# Patient Record
Sex: FEMALE | Race: WHITE | Employment: UNEMPLOYED | URBAN - METROPOLITAN AREA
[De-identification: names, ages, dates, MRNs, and addresses within clinical notes are randomized per-mention and may not be internally consistent; named-entity substitution may affect disease eponyms.]

---

## 2017-12-26 LAB — HEPATITIS C ANTIBODY INTERPRETATION: NORMAL

## 2017-12-29 LAB
HPV COMMENT: NORMAL
HPV TYPE 16: NOT DETECTED
HPV TYPE 18: NOT DETECTED
HPVOH (OTHER TYPES): NOT DETECTED

## 2018-02-05 ENCOUNTER — HOSPITAL ENCOUNTER (OUTPATIENT)
Dept: ENDOSCOPY | Age: 53
Discharge: HOME | End: 2018-02-05
Attending: INTERNAL MEDICINE | Admitting: INTERNAL MEDICINE

## 2018-02-05 LAB — HCG(URINE) PREGNANCY TEST: NEGATIVE

## 2018-02-15 ENCOUNTER — OFFICE VISIT (OUTPATIENT)
Dept: FAMILY MEDICINE CLINIC | Age: 53
End: 2018-02-15

## 2018-02-15 VITALS
DIASTOLIC BLOOD PRESSURE: 68 MMHG | SYSTOLIC BLOOD PRESSURE: 106 MMHG | OXYGEN SATURATION: 98 % | RESPIRATION RATE: 16 BRPM | BODY MASS INDEX: 23.14 KG/M2 | TEMPERATURE: 98 F | HEIGHT: 66 IN | WEIGHT: 144 LBS | HEART RATE: 70 BPM

## 2018-02-15 DIAGNOSIS — Z76.89 ENCOUNTER TO ESTABLISH CARE WITH NEW DOCTOR: Primary | ICD-10-CM

## 2018-02-15 PROCEDURE — 99386 PREV VISIT NEW AGE 40-64: CPT | Performed by: FAMILY MEDICINE

## 2018-02-15 RX ORDER — BUTALBITAL, ACETAMINOPHEN AND CAFFEINE 50; 325; 40 MG/1; MG/1; MG/1
1 TABLET ORAL PRN
COMMUNITY
Start: 2018-02-13 | End: 2019-12-20

## 2018-02-15 RX ORDER — PHENOL 1.4 %
1 AEROSOL, SPRAY (ML) MUCOUS MEMBRANE DAILY
COMMUNITY

## 2018-02-15 ASSESSMENT — ENCOUNTER SYMPTOMS
ABDOMINAL PAIN: 0
CHEST TIGHTNESS: 0
NAUSEA: 0
EYE PAIN: 0
COUGH: 0
VOMITING: 0
SORE THROAT: 0
CONSTIPATION: 0
WHEEZING: 0
RHINORRHEA: 0
SHORTNESS OF BREATH: 0
DIARRHEA: 0
ANAL BLEEDING: 0
BACK PAIN: 0
TROUBLE SWALLOWING: 0
EYE REDNESS: 0

## 2018-02-15 NOTE — PROGRESS NOTES
[facility]  Clinic Note    Date: 2/15/2018                                               Subjective:     Chief Complaint   Patient presents with    New Patient     NEW PT TO ESTABLISH CARE     HPI  Recently tried to apply for health insurance but denied due to error of diagnosis of Hep C which she has never had. She brings in copies of her testing for hepatitis which are negative. Had colonoscopy earlier this month, normal and recheck in 10 years. Recent pap smear which is normal and negative for HPV. Mammogram in Dec 2017 which was normal.  Was prescribed anti depressant in  but did not take, chart reports history of depression, which she does not have. Does get some hot flashes related to menopause. Recent fasting labs at [facility] that were 100% normal including CBC, CMP, and lipid panel. HDL 92. Patient Active Problem List    Diagnosis Date Noted    Tension headache 2016    Family history of breast cancer      Past Medical History:   Diagnosis Date    Anxiety     Family history of breast cancer     mother in 46s    Routine gynecological examination     mitchel    Tension headache      Past Surgical History:   Procedure Laterality Date     SECTION      MAXILLECTOMY  2009    WISDOM TOOTH EXTRACTION      X [address] Outpatient Visit on 2018   Component Date Value Ref Range Status    HCG(Urine) Pregnancy Test 2018 Negative  Detects HCG level >20 MIU/mL Final    Comment: Note:  False Negative pregnancy results have been reported in  early pregnancy due to insufficient amounts of hCG and in late  first trimester pregnancies, though very rare, due to the hook  effect.   Always repeat results in question with a serum  quantitative pregnancy test. A serum hCG is positive 2-5 days  before the urine pregnancy test.       Family History   Problem Relation Age of Onset    Breast Cancer Mother       age 80 met ds,in , hx of lymphoma also age 36    High Blood Pressure Mother     Cancer Father      gastric , age 68   [address] [facility] No Known Problems Sister     No Known Problems Sister     No Known Problems Sister     Colon Cancer Maternal Grandmother      Current Outpatient Prescriptions   Medication Sig Dispense Refill    butalbital-acetaminophen-caffeine (FIORICET, ESGIC) -40 MG per tablet Take 1 tablet by mouth as needed      vitamin D (CHOLECALCIFEROL) 1000 units TABS tablet Take 2,000 Units by mouth daily      calcium carbonate 600 MG TABS tablet Take 1 tablet by mouth daily      butalbital-acetaminophen-caffeine (FIORICET, ESGIC) -40 MG per tablet Take 1 tablet by mouth every 6 hours as needed for Headaches 40 tablet 1     No current facility-administered medications for this visit. Allergies   Allergen Reactions    Latex      WELTS       Review of Systems   Constitutional: Negative for chills, fever and unexpected weight change. HENT: Negative for congestion, ear pain, mouth sores, rhinorrhea, sore throat and trouble swallowing. Eyes: Negative for pain, redness and visual disturbance. Respiratory: Negative for cough, chest tightness, shortness of breath and wheezing. Cardiovascular: Negative for chest pain, palpitations and leg swelling. Gastrointestinal: Negative for abdominal pain, anal bleeding, constipation, diarrhea, nausea and vomiting. Genitourinary: Negative for decreased urine volume, difficulty urinating, dysuria, frequency and hematuria. Musculoskeletal: Negative for arthralgias, back pain, joint swelling and neck pain. Neurological: Positive for headaches. Negative for dizziness, tremors, seizures, syncope and numbness. Psychiatric/Behavioral: Negative for dysphoric mood and sleep disturbance. The patient is not nervous/anxious.         Objective:  /68 (Site: Right Arm, Position: Sitting, Cuff Size: Medium Adult)   Pulse 70   Temp 98 °F (36.7 °C) (Oral)   Resp 16   Ht 5' 5.5\" (1.664 m)

## 2018-12-12 ENCOUNTER — OFFICE VISIT (OUTPATIENT)
Dept: ENT CLINIC | Age: 53
End: 2018-12-12
Payer: COMMERCIAL

## 2018-12-12 VITALS
DIASTOLIC BLOOD PRESSURE: 76 MMHG | WEIGHT: 150 LBS | BODY MASS INDEX: 24.11 KG/M2 | HEIGHT: 66 IN | SYSTOLIC BLOOD PRESSURE: 118 MMHG

## 2018-12-12 DIAGNOSIS — H81.01 LABYRINTHINE VERTIGO WITH INVOLVEMENT OF RIGHT INNER EAR: Primary | ICD-10-CM

## 2018-12-12 PROCEDURE — 99204 OFFICE O/P NEW MOD 45 MIN: CPT | Performed by: OTOLARYNGOLOGY

## 2018-12-12 PROCEDURE — 1036F TOBACCO NON-USER: CPT | Performed by: OTOLARYNGOLOGY

## 2018-12-12 PROCEDURE — G8420 CALC BMI NORM PARAMETERS: HCPCS | Performed by: OTOLARYNGOLOGY

## 2018-12-12 PROCEDURE — G8427 DOCREV CUR MEDS BY ELIG CLIN: HCPCS | Performed by: OTOLARYNGOLOGY

## 2018-12-12 PROCEDURE — 3017F COLORECTAL CA SCREEN DOC REV: CPT | Performed by: OTOLARYNGOLOGY

## 2018-12-12 PROCEDURE — G8484 FLU IMMUNIZE NO ADMIN: HCPCS | Performed by: OTOLARYNGOLOGY

## 2018-12-12 RX ORDER — PREDNISONE 10 MG/1
TABLET ORAL
Qty: 25 TABLET | Refills: 0 | Status: SHIPPED | OUTPATIENT
Start: 2018-12-12 | End: 2019-06-12 | Stop reason: ALTCHOICE

## 2018-12-12 ASSESSMENT — ENCOUNTER SYMPTOMS
VOICE CHANGE: 0
EYES NEGATIVE: 1
SORE THROAT: 0
SINUS PAIN: 0
FACIAL SWELLING: 0
TROUBLE SWALLOWING: 0
RESPIRATORY NEGATIVE: 1
SINUS PRESSURE: 0
ALLERGIC/IMMUNOLOGIC NEGATIVE: 1
RHINORRHEA: 0

## 2019-06-12 ENCOUNTER — OFFICE VISIT (OUTPATIENT)
Dept: FAMILY MEDICINE CLINIC | Age: 54
End: 2019-06-12
Payer: COMMERCIAL

## 2019-06-12 VITALS
SYSTOLIC BLOOD PRESSURE: 112 MMHG | HEIGHT: 66 IN | HEART RATE: 68 BPM | DIASTOLIC BLOOD PRESSURE: 62 MMHG | WEIGHT: 154.8 LBS | RESPIRATION RATE: 18 BRPM | BODY MASS INDEX: 24.88 KG/M2

## 2019-06-12 DIAGNOSIS — G43.819 OTHER MIGRAINE WITHOUT STATUS MIGRAINOSUS, INTRACTABLE: Primary | ICD-10-CM

## 2019-06-12 PROCEDURE — 3017F COLORECTAL CA SCREEN DOC REV: CPT | Performed by: NURSE PRACTITIONER

## 2019-06-12 PROCEDURE — G8427 DOCREV CUR MEDS BY ELIG CLIN: HCPCS | Performed by: NURSE PRACTITIONER

## 2019-06-12 PROCEDURE — 1036F TOBACCO NON-USER: CPT | Performed by: NURSE PRACTITIONER

## 2019-06-12 PROCEDURE — 99213 OFFICE O/P EST LOW 20 MIN: CPT | Performed by: NURSE PRACTITIONER

## 2019-06-12 PROCEDURE — G8419 CALC BMI OUT NRM PARAM NOF/U: HCPCS | Performed by: NURSE PRACTITIONER

## 2019-06-12 RX ORDER — KETOROLAC TROMETHAMINE 30 MG/ML
60 INJECTION, SOLUTION INTRAMUSCULAR; INTRAVENOUS ONCE
Status: COMPLETED | OUTPATIENT
Start: 2019-06-12 | End: 2019-06-12

## 2019-06-12 RX ORDER — BUTALBITAL, ACETAMINOPHEN AND CAFFEINE 50; 325; 40 MG/1; MG/1; MG/1
1 TABLET ORAL EVERY 4 HOURS PRN
Qty: 20 TABLET | Refills: 2 | Status: SHIPPED | OUTPATIENT
Start: 2019-06-12 | End: 2019-10-07 | Stop reason: SDUPTHER

## 2019-06-12 RX ADMIN — KETOROLAC TROMETHAMINE 60 MG: 30 INJECTION, SOLUTION INTRAMUSCULAR; INTRAVENOUS at 16:18

## 2019-06-12 ASSESSMENT — PATIENT HEALTH QUESTIONNAIRE - PHQ9
1. LITTLE INTEREST OR PLEASURE IN DOING THINGS: 0
SUM OF ALL RESPONSES TO PHQ9 QUESTIONS 1 & 2: 1
2. FEELING DOWN, DEPRESSED OR HOPELESS: 1
SUM OF ALL RESPONSES TO PHQ QUESTIONS 1-9: 1
SUM OF ALL RESPONSES TO PHQ QUESTIONS 1-9: 1

## 2019-06-12 NOTE — PROGRESS NOTES
Administrations This Visit     ketorolac (TORADOL) injection 60 mg     Admin Date  06/12/2019  16:18 Action  Given Dose  60 mg Route  Intramuscular Site  Ventrogluteal Right Administered By  Jolly Giraldo MA    Ordering Provider:  CHRISTIANA Reddy CNP    NDC:  3759-9669-40    Lot#:  [id number]    :  [employer]    Patient Supplied?:  No    Comments:  SITE: RIGHT [facility]# [id number] -XENTO DATE: 04/01/2020VERIFIED BY: LISA
oz (70.2 kg)   Height: 5' 5.5\" (1.664 m)     Body mass index is 25.37 kg/m². Wt Readings from Last 3 Encounters:   06/12/19 154 lb 12.8 oz (70.2 kg)   12/12/18 150 lb (68 kg)   02/15/18 144 lb (65.3 kg)     BP Readings from Last 3 Encounters:   06/12/19 112/62   12/12/18 118/76   02/15/18 106/68        No results found for this visit on 06/12/19. Assessment     Diagnosis Orders   1. Other migraine without status migrainosus, intractable  butalbital-acetaminophen-caffeine (FIORICET, ESGIC) -40 MG per tablet         Plan    Migraine headache x3 days  I discussed with patient that in order to get this headache controlled it would be best that she get an injectable to start and then to follow with the Fioricet. Toradol 60 mg IM given  Fioricet given for as needed for the migraines in the future  It is best to hit the migraine at onset with the medication, as they are harder to control once they have been going on this long. She appears to be harboring a lot of emotions that are contributing to these migraines, currently    No follow-ups on file. There are no Patient Instructions on file for this visit.

## 2019-10-07 ENCOUNTER — TELEPHONE (OUTPATIENT)
Dept: FAMILY MEDICINE CLINIC | Age: 54
End: 2019-10-07

## 2019-10-07 DIAGNOSIS — G43.819 OTHER MIGRAINE WITHOUT STATUS MIGRAINOSUS, INTRACTABLE: ICD-10-CM

## 2019-10-07 RX ORDER — BUTALBITAL, ACETAMINOPHEN AND CAFFEINE 50; 325; 40 MG/1; MG/1; MG/1
1 TABLET ORAL EVERY 4 HOURS PRN
Qty: 20 TABLET | Refills: 0 | Status: SHIPPED | OUTPATIENT
Start: 2019-10-07 | End: 2019-12-20 | Stop reason: SDUPTHER

## 2019-12-20 ENCOUNTER — TELEPHONE (OUTPATIENT)
Dept: FAMILY MEDICINE CLINIC | Age: 54
End: 2019-12-20

## 2019-12-20 DIAGNOSIS — G43.819 OTHER MIGRAINE WITHOUT STATUS MIGRAINOSUS, INTRACTABLE: ICD-10-CM

## 2019-12-20 RX ORDER — BUTALBITAL, ACETAMINOPHEN AND CAFFEINE 50; 325; 40 MG/1; MG/1; MG/1
1 TABLET ORAL EVERY 4 HOURS PRN
Qty: 20 TABLET | Refills: 0 | Status: SHIPPED | OUTPATIENT
Start: 2019-12-20 | End: 2020-01-09 | Stop reason: SDUPTHER

## 2020-01-09 ENCOUNTER — OFFICE VISIT (OUTPATIENT)
Dept: FAMILY MEDICINE CLINIC | Age: 55
End: 2020-01-09
Payer: COMMERCIAL

## 2020-01-09 VITALS
BODY MASS INDEX: 25.88 KG/M2 | SYSTOLIC BLOOD PRESSURE: 100 MMHG | HEART RATE: 74 BPM | HEIGHT: 66 IN | RESPIRATION RATE: 20 BRPM | OXYGEN SATURATION: 98 % | WEIGHT: 161 LBS | DIASTOLIC BLOOD PRESSURE: 62 MMHG

## 2020-01-09 PROCEDURE — 99214 OFFICE O/P EST MOD 30 MIN: CPT | Performed by: NURSE PRACTITIONER

## 2020-01-09 RX ORDER — TOPIRAMATE 50 MG/1
TABLET, FILM COATED ORAL
Qty: 60 TABLET | Refills: 3 | Status: SHIPPED | OUTPATIENT
Start: 2020-01-09

## 2020-01-09 RX ORDER — BUTALBITAL, ACETAMINOPHEN AND CAFFEINE 50; 325; 40 MG/1; MG/1; MG/1
1 TABLET ORAL EVERY 4 HOURS PRN
Qty: 20 TABLET | Refills: 3 | Status: SHIPPED | OUTPATIENT
Start: 2020-01-09

## 2020-01-09 ASSESSMENT — PATIENT HEALTH QUESTIONNAIRE - PHQ9
1. LITTLE INTEREST OR PLEASURE IN DOING THINGS: 0
SUM OF ALL RESPONSES TO PHQ9 QUESTIONS 1 & 2: 0
2. FEELING DOWN, DEPRESSED OR HOPELESS: 0
SUM OF ALL RESPONSES TO PHQ QUESTIONS 1-9: 0
SUM OF ALL RESPONSES TO PHQ QUESTIONS 1-9: 0

## 2020-01-09 NOTE — PROGRESS NOTES
Mahesh Caba  49 y.o. female    1965      CC: Migraine follow up     Chief Complaint   Patient presents with    Migraine     PT IS HERE FOR MIGRAINE MEDICATION REFILLS      HPI    Migraine follow up - doing better right now - not sure why. Were worse through holidays. Was getting 1-3 weekly - some migraines would last up to 3 days. Since Valentin - no migraines. Has had headaches, but not migraines. Weather affects her migraines. Barometric pressure affects the migraines. Has trigger foods, but not sure what they are. Migraine symptoms - nausea, + light sensitivity, sometimes sound sensitive, never consistent location. Sometimes left, sometimes right, or temporal, but definite condensed area, usually behind eyes - one eye really hurts. Has never been on a prevention medication. Does tend to get more than 4 monthly. Allergies   Allergen Reactions    Latex      WELTS    Sulfa Antibiotics Rash       Physical  Examination    Physical Exam  Vitals signs and nursing note reviewed. Constitutional:       General: She is not in acute distress. Appearance: She is not diaphoretic. HENT:      Head: Normocephalic. Comments: When headaches occur - behind eyes. Cardiovascular:      Rate and Rhythm: Normal rate and regular rhythm. Heart sounds: No murmur. No friction rub. No gallop. Pulmonary:      Effort: Pulmonary effort is normal. No accessory muscle usage or respiratory distress. Breath sounds: No decreased breath sounds, wheezing, rhonchi or rales. Skin:     General: Skin is warm and dry. Neurological:      Mental Status: She is alert and oriented to person, place, and time. Psychiatric:         Mood and Affect: Mood normal.         Behavior: Behavior normal.         Thought Content: Thought content normal.         Judgment: Judgment normal.      Comments: Long discussion about how toxic her family is.           Vitals:    01/09/20 0810   BP:

## 2020-04-30 ENCOUNTER — TELEMEDICINE (OUTPATIENT)
Dept: FAMILY MEDICINE CLINIC | Age: 55
End: 2020-04-30
Payer: COMMERCIAL

## 2020-04-30 VITALS — HEIGHT: 66 IN | BODY MASS INDEX: 26.38 KG/M2

## 2020-04-30 PROCEDURE — 99214 OFFICE O/P EST MOD 30 MIN: CPT | Performed by: FAMILY MEDICINE

## 2020-04-30 RX ORDER — TRAMADOL HYDROCHLORIDE 50 MG/1
50 TABLET ORAL EVERY 4 HOURS PRN
Qty: 15 TABLET | Refills: 0 | Status: SHIPPED | OUTPATIENT
Start: 2020-04-30 | End: 2020-05-05

## 2020-04-30 RX ORDER — SUMATRIPTAN 50 MG/1
50 TABLET, FILM COATED ORAL DAILY PRN
Qty: 9 TABLET | Refills: 0 | Status: SHIPPED | OUTPATIENT
Start: 2020-04-30

## 2020-04-30 NOTE — PROGRESS NOTES
Alert and awake  [] Oriented to person/place/time [x]Able to follow commands      Eyes:  EOM    []  Normal  [] Abnormal-  Sclera  [x]  Normal  [] Abnormal -         Discharge [x]  None visible  [] Abnormal -    HENT:   [x] Normocephalic, atraumatic. [] Abnormal   [] Mouth/Throat: Mucous membranes are moist.     External Ears [] Normal  [] Abnormal-     Neck: [x] No visualized mass pain to palpation posterior right neck. No pain to palpation over lymph nodes. Pulmonary/Chest: [x] Respiratory effort normal.  [x] No visualized signs of difficulty breathing or respiratory distress        [] Abnormal-      Musculoskeletal:   [] Normal gait with no signs of ataxia         [] Normal range of motion of neck        [] Abnormal-  Pain to palp right post neck. Neurological:        [x] No Facial Asymmetry (Cranial nerve 7 motor function) (limited exam to video visit)          [x] No gaze palsy        [x] Abnormal-points to site of headache as behind her right eye. Skin:        [x] No significant exanthematous lesions or discoloration noted on facial skin         [] Abnormal-            Psychiatric:       [] Normal Affect [] No Hallucinations        [x] Abnormal-crying at the beginning of the session. Patient became more relaxed during the session and stopped crying. Other pertinent observable physical exam findings-   Vitals:    04/30/20 1548   Height: 5' 5.5\" (1.664 m)     BP Readings from Last 3 Encounters:   01/09/20 100/62   06/12/19 112/62   12/12/18 118/76     Pulse Readings from Last 3 Encounters:   01/09/20 74   06/12/19 68   02/15/18 70     Wt Readings from Last 3 Encounters:   01/09/20 161 lb (73 kg)   06/12/19 154 lb 12.8 oz (70.2 kg)   12/12/18 150 lb (68 kg)     Body mass index is 26.38 kg/m². ASSESSMENT/PLAN:  Seth Arrieta was seen today for migraine.     Diagnoses and all orders for this visit:    Intractable chronic migraine without aura and with status migrainosus  -     MAGNESIUM; Future  -     BASIC METABOLIC PANEL; Future  -     traMADol (ULTRAM) 50 MG tablet; Take 1 tablet by mouth every 4 hours as needed for Pain for up to 5 days. Intended supply: 5 days. Take lowest dose possible to manage pain  -     SUMAtriptan (IMITREX) 50 MG tablet; Take 1 tablet by mouth daily as needed for Migraine may repeat in 1 hr if no relief of acute migraine    Stay well-hydrated. Your goal for fluids is 73 ounces per day. Eat frequent small meals with some carbohydrates and some protein with each meal.  Avoid replacing a meal with sweets which stimulates insulin but without carbohydrates to back it up causes blood sugars to go down resulting in headaches. The magnesium is a good idea for migraine prevention but does not work as well for treatment. Recommend you take magnesium 250 mg once daily with a meal.  The only major side effect with magnesium is looser stools. If you have a problem with diarrhea you should not start this regimen. If you have constipation it will help. Another thing that can be helpful is a B complex vitamin once daily. Higher doses of riboflavin are often used for migraine prevention. Would recommend you schedule a visit just to discuss a supplement regimen that can help prevent migraines. If we tried to discuss this in the setting of an illness like a sinus infection we will not have adequate time to discuss which supplements are best for you and which you should avoid. Medication side effect  The Topamax causes numbness and tingling in many people. It is oftentimes associated with mild dehydration. It causes the carbon dioxide in your blood to go down which is what causes the tingling and may affect your taste as well. This is why when you are on this medication we always monitor your blood for kidney function to see if the CO2 (carbon dioxide) is low.   Is important with migraines regardless of what medicine you are on to drink plenty of fluids to stay hydrated. Educated about COVID-19 virus infection    Preventing the Spread of Coronavirus Disease 2019 in Homes and Residential Communities   For the most recent information go to [email or web address]    Prevention steps for People with confirmed or suspected COVID-19 (including persons under investigation) who do not need to be hospitalized  and   People with confirmed COVID-19 who were hospitalized and determined to be medically stable to go home    Your healthcare provider and public health staff will evaluate whether you can be cared for at home. If it is determined that you do not need to be hospitalized and can be isolated at home, you will be monitored by staff from your local or state health department. You should follow the prevention steps below until a healthcare provider or local or state health department says you can return to your normal activities. Stay home except to get medical care  People who are mildly ill with COVID-19 are able to isolate at home during their illness. You should restrict activities outside your home, except for getting medical care. Do not go to work, school, or public areas. Avoid using public transportation, ride-sharing, or taxis. Separate yourself from other people and animals in your home  People: As much as possible, you should stay in a specific room and away from other people in your home. Also, you should use a separate bathroom, if available. Animals: You should restrict contact with pets and other animals while you are sick with COVID-19, just like you would around other people. Although there have not been reports of pets or other animals becoming sick with COVID-19, it is still recommended that people sick with COVID-19 limit contact with animals until more information is known about the virus. When possible, have another member of your household care for your animals while you are sick.  If you are sick with COVID-19, avoid contact with your pet, including petting, snuggling, being kissed or licked, and sharing food. If you must care for your pet or be around animals while you are sick, wash your hands before and after you interact with pets and wear a facemask. Call ahead before visiting your doctor  If you have a medical appointment, call the healthcare provider and tell them that you have or may have COVID-19. This will help the healthcare providers office take steps to keep other people from getting infected or exposed. Wear a facemask  You should wear a facemask when you are around other people (e.g., sharing a room or vehicle) or pets and before you enter a healthcare providers office. If you are not able to wear a facemask (for example, because it causes trouble breathing), then people who live with you should not stay in the same room with you, or they should wear a facemask if they enter your room. Cover your coughs and sneezes  Cover your mouth and nose with a tissue when you cough or sneeze. Throw used tissues in a lined trash can. Immediately wash your hands with soap and water for at least 20 seconds or, if soap and water are not available, clean your hands with an alcohol-based hand  that contains at least 60% alcohol. Clean your hands often  Wash your hands often with soap and water for at least 20 seconds, especially after blowing your nose, coughing, or sneezing; going to the bathroom; and before eating or preparing food. If soap and water are not readily available, use an alcohol-based hand  with at least 60% alcohol, covering all surfaces of your hands and rubbing them together until they feel dry. Soap and water are the best option if hands are visibly dirty. Avoid touching your eyes, nose, and mouth with unwashed hands.   Avoid sharing personal household items  You should not share dishes, drinking glasses, cups, eating utensils, towels, or bedding with other people or even during treatment with antibiotics, until symptom free. 1. Water: Drink 1 ounce of water for every 2 pounds of body weight for adults, you need 74 ounces of water/fluids per day. This will loosen mucus in the head and chest & improve the weak feeling of dehydration, allow the body to get germ fighting resources to the infection. Half of fluids can be juice or sugar free Crystal Light. Don't count drinks with caffeine, alcohol or carbonation. Infants can have Pedialyte liquid or freezer pops. Avoid salt and sports drinks if you have high Blood Pressure, swelling in the feet or ankles or have heart problems. 2. Humidity: Humidify the air to 35-50% ( or until the windows fog over slightly). Can use a humidifier, vaporizer, boil water on the stove or put a coffee can full of water on the heater vents. This will loosen mucus from infections and allergies. 3. Sleep: Get 8-10 hours a night and rest during the evening after work or school. If you have trouble sleeping, adults can take Melatonin 5mg up to 2 tabs at bedtime ( not for children or pregnant women). If Mono is suspected then sleep during 9PM to 9AM time span (if possible.)  4. Cough: Take cough medicines with Guaifenesin ( to loosen chest or head congestion) and Dextromethorphan ( to decrease excess cough). Robitussin D.M. Syrup every 4-6 hrs OR Mucinex D. M. pills OR Delsym DM syrup twice a day. Use the pediatric formulations for children over 6 months making sure they are alcohol & sugar free for children, pregnant women, and diabetics. 5. Pain And Fevers: Take Acetaminophen ( Tylenol) for fevers, aches, and headaches. 2-500 mg every 8 hours for adults. Appropriate doses at bedtime for children may help them sleep better. If pregnant take 1 -500 mg (Tylenol) every 8 hours as needed.  Ibuprofen/Aleve/aspirin for pain and fevers SHOULD NOT BE USED IN THE SETTING OF POSSIBLE COVID-19 viral infection NOR if pregnant, if you have acid reflux, high blood

## 2020-04-30 NOTE — PATIENT INSTRUCTIONS
Instructions for Respiratory Infections (SAVE THIS SHEET)    For the first 7-14 days of symptoms follow instructions below, even before being seen in the office or even during treatment with antibiotics, until symptom free. 1. Water: Drink 1 ounce of water for every 2 pounds of body weight for adults, you need 74 ounces of water/fluids per day. This will loosen mucus in the head and chest & improve the weak feeling of dehydration, allow the body to get germ fighting resources to the infection. Half of fluids can be juice or sugar free Crystal Light. Don't count drinks with caffeine, alcohol or carbonation. Infants can have Pedialyte liquid or freezer pops. Avoid salt and sports drinks if you have high Blood Pressure, swelling in the feet or ankles or have heart problems. 2. Humidity: Humidify the air to 35-50% ( or until the windows fog over slightly). Can use a humidifier, vaporizer, boil water on the stove or put a coffee can full of water on the heater vents. This will loosen mucus from infections and allergies. 3. Sleep: Get 8-10 hours a night and rest during the evening after work or school. If you have trouble sleeping, adults can take Melatonin 5mg up to 2 tabs at bedtime ( not for children or pregnant women). If Mono is suspected then sleep during 9PM to 9AM time span (if possible.)  4. Cough: Take cough medicines with Guaifenesin ( to loosen chest or head congestion) and Dextromethorphan ( to decrease excess cough). Robitussin D.M. Syrup every 4-6 hrs OR Mucinex D. M. pills OR Delsym DM syrup twice a day. Use the pediatric formulations for children over 6 months making sure they are alcohol & sugar free for children, pregnant women, and diabetics. 5. Pain And Fevers: Take Acetaminophen ( Tylenol) for fevers, aches, and headaches. 2-500 mg every 8 hours for adults. Appropriate doses at bedtime for children may help them sleep better. If pregnant take 1 -500 mg (Tylenol) every 8 hours as needed.

## 2021-01-15 ENCOUNTER — OFFICE VISIT (OUTPATIENT)
Dept: FAMILY MEDICINE CLINIC | Age: 56
End: 2021-01-15
Payer: COMMERCIAL

## 2021-01-15 VITALS
SYSTOLIC BLOOD PRESSURE: 110 MMHG | HEART RATE: 68 BPM | WEIGHT: 163 LBS | BODY MASS INDEX: 26.2 KG/M2 | HEIGHT: 66 IN | TEMPERATURE: 97.6 F | DIASTOLIC BLOOD PRESSURE: 64 MMHG | OXYGEN SATURATION: 98 %

## 2021-01-15 DIAGNOSIS — Z12.31 ENCOUNTER FOR SCREENING MAMMOGRAM FOR MALIGNANT NEOPLASM OF BREAST: ICD-10-CM

## 2021-01-15 DIAGNOSIS — E78.89 ELEVATED HDL: ICD-10-CM

## 2021-01-15 DIAGNOSIS — R63.5 WEIGHT GAIN: ICD-10-CM

## 2021-01-15 DIAGNOSIS — I88.9 AXILLARY LYMPHADENITIS: ICD-10-CM

## 2021-01-15 DIAGNOSIS — R53.83 FATIGUE, UNSPECIFIED TYPE: ICD-10-CM

## 2021-01-15 DIAGNOSIS — Z80.3 FAMILY HISTORY OF BREAST CANCER: ICD-10-CM

## 2021-01-15 DIAGNOSIS — R59.0 LYMPHADENOPATHY, AXILLARY: Primary | ICD-10-CM

## 2021-01-15 DIAGNOSIS — R73.01 IMPAIRED FASTING GLUCOSE: ICD-10-CM

## 2021-01-15 LAB
A/G RATIO: 2 (ref 1.1–2.2)
ALBUMIN SERPL-MCNC: 4.5 G/DL (ref 3.4–5)
ALP BLD-CCNC: 62 U/L (ref 40–129)
ALT SERPL-CCNC: 26 U/L (ref 10–40)
ANION GAP SERPL CALCULATED.3IONS-SCNC: 9 MMOL/L (ref 3–16)
AST SERPL-CCNC: 22 U/L (ref 15–37)
BASOPHILS ABSOLUTE: 0 K/UL (ref 0–0.2)
BASOPHILS RELATIVE PERCENT: 0.8 %
BILIRUB SERPL-MCNC: 1 MG/DL (ref 0–1)
BUN BLDV-MCNC: 7 MG/DL (ref 7–20)
CALCIUM SERPL-MCNC: 9.4 MG/DL (ref 8.3–10.6)
CHLORIDE BLD-SCNC: 105 MMOL/L (ref 99–110)
CHOLESTEROL, TOTAL: 138 MG/DL (ref 0–199)
CO2: 26 MMOL/L (ref 21–32)
CREAT SERPL-MCNC: 0.6 MG/DL (ref 0.6–1.1)
EOSINOPHILS ABSOLUTE: 0.1 K/UL (ref 0–0.6)
EOSINOPHILS RELATIVE PERCENT: 1.9 %
GFR AFRICAN AMERICAN: >60
GFR NON-AFRICAN AMERICAN: >60
GLOBULIN: 2.2 G/DL
GLUCOSE BLD-MCNC: 90 MG/DL (ref 70–99)
HCT VFR BLD CALC: 39.1 % (ref 36–48)
HDLC SERPL-MCNC: 86 MG/DL (ref 40–60)
HEMOGLOBIN: 13.3 G/DL (ref 12–16)
LDL CHOLESTEROL CALCULATED: 43 MG/DL
LYMPHOCYTES ABSOLUTE: 1.2 K/UL (ref 1–5.1)
LYMPHOCYTES RELATIVE PERCENT: 39 %
MCH RBC QN AUTO: 32.2 PG (ref 26–34)
MCHC RBC AUTO-ENTMCNC: 34 G/DL (ref 31–36)
MCV RBC AUTO: 94.7 FL (ref 80–100)
MONOCYTES ABSOLUTE: 0.2 K/UL (ref 0–1.3)
MONOCYTES RELATIVE PERCENT: 7.9 %
NEUTROPHILS ABSOLUTE: 1.5 K/UL (ref 1.7–7.7)
NEUTROPHILS RELATIVE PERCENT: 50.4 %
PDW BLD-RTO: 13.1 % (ref 12.4–15.4)
PLATELET # BLD: 199 K/UL (ref 135–450)
PMV BLD AUTO: 8.3 FL (ref 5–10.5)
POTASSIUM SERPL-SCNC: 4.2 MMOL/L (ref 3.5–5.1)
RBC # BLD: 4.13 M/UL (ref 4–5.2)
SODIUM BLD-SCNC: 140 MMOL/L (ref 136–145)
TOTAL PROTEIN: 6.7 G/DL (ref 6.4–8.2)
TRIGL SERPL-MCNC: 43 MG/DL (ref 0–150)
VLDLC SERPL CALC-MCNC: 9 MG/DL
WBC # BLD: 3 K/UL (ref 4–11)

## 2021-01-15 PROCEDURE — 36415 COLL VENOUS BLD VENIPUNCTURE: CPT | Performed by: NURSE PRACTITIONER

## 2021-01-15 PROCEDURE — 99214 OFFICE O/P EST MOD 30 MIN: CPT | Performed by: NURSE PRACTITIONER

## 2021-01-15 ASSESSMENT — PATIENT HEALTH QUESTIONNAIRE - PHQ9
2. FEELING DOWN, DEPRESSED OR HOPELESS: 0
SUM OF ALL RESPONSES TO PHQ9 QUESTIONS 1 & 2: 0
SUM OF ALL RESPONSES TO PHQ QUESTIONS 1-9: 0

## 2021-01-15 ASSESSMENT — ENCOUNTER SYMPTOMS
EYE DISCHARGE: 0
NAUSEA: 0
ABDOMINAL PAIN: 0
COLOR CHANGE: 0
BACK PAIN: 0
ABDOMINAL DISTENTION: 0
CONSTIPATION: 0
COUGH: 0
CHEST TIGHTNESS: 0
SHORTNESS OF BREATH: 0
SINUS PAIN: 0
DIARRHEA: 0
SINUS PRESSURE: 0

## 2021-01-15 NOTE — PROGRESS NOTES
Date of Service:  1/15/2021    Khushbu Jamison (:  1965) is a 48 y.o. female, here for evaluation of the following medical concerns:    Chief Complaint   Patient presents with    Lymphadenopathy        HPI     Lymphadenopathy  Pt notes swollen lymph node in right axilla, painful. Pain radiates down arm, hurts worse at night. First noticed about 1 week ago. Hx of large infected lymph node in 6th grade and needed a surgery to have lymph node removed. Pt also reports left great toe randomly swelling and painful but also has toenail fungus. Both ankles swollen. Mother had kidney cancer and lymphoma. Not exercising like she used to. Fam hx breast cancer- sister had breast cancer, non genetic breast cancer per pt. Sister also having \"issues with lymph nodes. \" Last mammogram was in 2017. Pt is post menopausal.       Review of Systems   Constitutional: Negative for activity change, appetite change, fatigue, fever and unexpected weight change. HENT: Negative for congestion, ear pain, sinus pressure and sinus pain. Eyes: Negative for discharge and visual disturbance. Respiratory: Negative for cough, chest tightness and shortness of breath. Cardiovascular: Positive for leg swelling (dev ankles). Negative for chest pain and palpitations. Gastrointestinal: Negative for abdominal distention, abdominal pain, constipation, diarrhea and nausea. Endocrine: Negative for cold intolerance, heat intolerance, polydipsia, polyphagia and polyuria. Genitourinary: Negative for decreased urine volume, difficulty urinating, dysuria, flank pain, frequency and urgency. Musculoskeletal: Negative for arthralgias, back pain, gait problem, joint swelling, myalgias and neck pain. Skin: Negative for color change, rash and wound. Allergic/Immunologic: Negative for food allergies and immunocompromised state. General: She is awake. Appearance: Normal appearance. She is well-developed, well-groomed and overweight. She is not ill-appearing. HENT:      Head: Normocephalic and atraumatic. Right Ear: Hearing, tympanic membrane, ear canal and external ear normal.      Left Ear: Hearing, tympanic membrane, ear canal and external ear normal.      Nose: Nose normal.      Mouth/Throat:      Lips: Pink. Mouth: Mucous membranes are moist.      Pharynx: Oropharynx is clear. Eyes:      General: Lids are normal.      Extraocular Movements: Extraocular movements intact. Conjunctiva/sclera: Conjunctivae normal.      Pupils: Pupils are equal, round, and reactive to light. Neck:      Musculoskeletal: Full passive range of motion without pain, normal range of motion and neck supple. Thyroid: No thyromegaly. Vascular: No carotid bruit. Cardiovascular:      Rate and Rhythm: Normal rate. Pulses:           Carotid pulses are 2+ on the right side and 2+ on the left side. Radial pulses are 2+ on the right side and 2+ on the left side. Posterior tibial pulses are 2+ on the right side and 2+ on the left side. Heart sounds: Normal heart sounds, S1 normal and S2 normal. No murmur. Pulmonary:      Effort: Pulmonary effort is normal.      Breath sounds: Normal breath sounds. Chest:      Chest wall: No mass, lacerations, deformity, swelling, tenderness, crepitus or edema. There is no dullness to percussion. Breasts: Breasts are symmetrical.         Right: No swelling, bleeding, inverted nipple, mass, nipple discharge, skin change or tenderness. Left: No swelling, bleeding, inverted nipple, mass, nipple discharge, skin change or tenderness. Abdominal:      General: Bowel sounds are normal. There is no abdominal bruit. Palpations: Abdomen is soft. Tenderness: There is no abdominal tenderness.    Genitourinary:     Comments: Deferred Musculoskeletal: Normal range of motion. Right lower leg: No edema. Left lower leg: No edema. Lymphadenopathy:      Head:      Right side of head: No submental, submandibular, tonsillar, preauricular, posterior auricular or occipital adenopathy. Left side of head: No submental, submandibular, tonsillar, preauricular, posterior auricular or occipital adenopathy. Cervical: No cervical adenopathy. Right cervical: No superficial, deep or posterior cervical adenopathy. Left cervical: No superficial, deep or posterior cervical adenopathy. Upper Body:      Right upper body: Axillary adenopathy present. No supraclavicular, pectoral or epitrochlear adenopathy. Left upper body: No supraclavicular, axillary, pectoral or epitrochlear adenopathy. Skin:     General: Skin is warm and dry. Capillary Refill: Capillary refill takes less than 2 seconds. Neurological:      General: No focal deficit present. Mental Status: She is alert and oriented to person, place, and time. Mental status is at baseline. Sensory: Sensation is intact. Motor: Motor function is intact. Coordination: Coordination is intact. Gait: Gait is intact. Psychiatric:         Attention and Perception: Attention and perception normal.         Mood and Affect: Mood normal. Affect is tearful. Speech: Speech normal.         Behavior: Behavior normal. Behavior is cooperative. Thought Content: Thought content normal.         Cognition and Memory: Cognition and memory normal.         Judgment: Judgment normal.         ASSESSMENT/PLAN:  1. Lymphadenopathy, axillary  R axilla, small pea to marble sized tender lymph node  Says swelling occurs occasionally in right arm especially at night  Discussed concerns with pt  Breast exam performed with pt permission, no concerning areas in breasts    2. Axillary lymphadenitis  - US SOFT TISSUE LIMITED AREA;  Future - CALE DIGITAL SCREEN W OR WO CAD BILATERAL; Future  - CBC Auto Differential  - Comprehensive Metabolic Panel  Consider round of antibiotics after testing complete but no redness/warmth noted  Consider referral to hematology/oncology    3. Family history of breast cancer  Sister had breast cancer  - US SOFT TISSUE LIMITED AREA; Future  - CALE DIGITAL SCREEN W OR WO CAD BILATERAL; Future    4. Encounter for screening mammogram for malignant neoplasm of breast  Information printed and reviewed  Pt aware of possible concerns for breast cancer with any axilla lymphadenopathy  - CALE DIGITAL SCREEN W OR WO CAD BILATERAL; Future    5. Impaired fasting glucose  Health maintenance recommends diabetes screening but no imp fasting glucoses noted to link A1C to at this time  Pt states she has had hypoglycemia in past    6. Elevated HDL  - Lipid Panel  Work on limiting saturated fats in diet, and eating a healthy balance of fruits, vegetables, lean proteins, and multigrains. 7. Weight gain  Up 9 lbs in 18 months  - TSH with Reflex    8.  Fatigue, unspecified type  - TSH with Reflex  - CBC Auto Differential        Care Gaps Addressed  Call insurance company to discuss coverage for shingles vaccine (Shingrix) 2 dose series   TDAP vaccine recommended  Mammogram recommended  Flu vaccine declined I have reviewed patient's pertinent medical history, relevant laboratory and imaging studies, and past/future health maintenance. Discussed with the patient the importance of adhering to their current medication regimen as directed. Advised the patient that they should continue to work on eating a healthy balanced diet and staying active by exercising within their personal limits. Orders as listed above. Patient was advised to keep future appointments with their respective specialty care team(s). Patient had the opportunity to ask questions, all of which were answered to the best of my ability and with patient satisfaction. Patient understands and is agreeable with the care plan following today's visit. Patient is to schedule an appointment for any new or worsening symptoms. Go to ER for significant shortness of breath, chest pain, or uncontrolled pain or fever. I discussed with patient the risk and benefits of any medications that were prescribed today. I verified that the patient understands their medications, labs, and/or procedures. The patient is doing well with current medication regimen and does not have any barriers to adherence. The patient's self-management abilities are good. Return in about 3 months (around 4/15/2021) for Physical Exam.    An electronic signature was used to authenticate this note.     --CHRISTIANA Joshua - CNP on 1/15/2021 at 4:26 PM

## 2021-01-15 NOTE — PATIENT INSTRUCTIONS
Call insurance company to discuss coverage for shingles vaccine (Shingrix) 2 dose series, also ask about TDAP vaccine. Patient Education        Learning About Breast Cancer Screening  What is breast cancer screening? Breast cancer occurs when cells that are not normal grow in one or both of your breasts. Screening tests can help find breast cancer early. Cancer is easier to treat when it's found early. Having concerns about breast cancer is common. That's why it's important to talk with your doctor about when to start and how often to get screened for breast cancer. How is breast cancer screening done? Several screening tests can be used to check for breast cancer. Mammograms. These tests check for signs of cancer using X-rays. They can show tumors that are too small for you or your doctor to feel. During a mammogram, a machine squeezes your breasts to make them flatter and easier to X-ray. At least two pictures are taken of each breast. One is taken from the top and one from the side. 3-D mammograms. These tests are also called digital breast tomosynthesis. Your breast is positioned on a flat plate. A top plate is pressed against your breast to keep it in position. The X-ray arm then moves in an arc above the breast and takes many pictures. A computer uses these X-rays to create a three-dimensional image. Clinical breast exam.   In this exam, your doctor carefully feels your breasts and under your arms to check for lumps or other changes. Who should be screened for breast cancer? Experts agree that mammograms are the best screening test for people at average risk of breast cancer. But they don't all agree on the age at which screening should start. And they don't agree on whether it's better to be screened every year or every two years. Here are some of the recommendations from experts:  · Start by age 36 and have a mammogram each year. · Start at age 39 and have a mammogram each year. · Start at age 48 and have a mammogram every 2 years. When to stop having mammograms is another decision. You and your doctor can decide on the right age to start and stop screening based on your personal preferences and overall health. What is your risk for breast cancer? If you don't already know your risk of breast cancer, you can ask your doctor about it. You can also look it up at www.cancer.gov/bcrisktool/. If your doctor says that you have a high or very high risk, ask about ways to reduce your risk. These could include getting extra screening, taking medicine, or having surgery. If you have a strong family history of breast cancer, ask your doctor about genetic testing. What steps can you take to stay healthy? Some things that increase your risk of breast cancer, such as your age and being female, cannot be controlled. But you can do some things to stay as healthy as you can. · Learn what your breasts normally look and feel like. If you notice any changes, tell your doctor. · If you drink alcohol, limit how much you drink. Any amount of alcohol may increase your risk for some types of cancer. · If you smoke, quit. When you quit smoking, you lower your chances of getting many types of cancer. You can also do your best to eat well, be active, and stay at a healthy weight. Eating healthy foods and being active every day, as well as staying at a healthy weight, may help prevent cancer. Where can you learn more? Go to https://lindsay.eMindful. org and sign in to your TryLife account. Enter T122 in the [employer] box to learn more about \"Learning About Breast Cancer Screening. \"     If you do not have an account, please click on the \"Sign Up Now\" link. Current as of: April 29, 2020               Content Version: 12.6  © 9018-9518 [employer]. Care instructions adapted under license by [facility] ([facility]). If you have questions about a medical condition or this instruction, always ask your healthcare professional. [address] any warranty or liability for your use of this information. Patient Education        Swollen Lymph Nodes: Care Instructions  Your Care Instructions     Lymph nodes are small, bean-shaped glands throughout the body. They help your body fight germs and infections. Lymph nodes often swell when there is a problem such as an injury, infection, or tumor. · The nodes in your neck, under your chin, or behind your ears may swell when you have a cold or sore throat. · An injury or infection in a leg or foot can make the nodes in your groin swell. · Sometimes medicine can make lymph nodes swell, but this is rare. Treatment depends on what caused your nodes to swell. Usually the nodes return to normal size without a problem. Follow-up care is a key part of your treatment and safety. Be sure to make and go to all appointments, and call your doctor if you are having problems. It's also a good idea to know your test results and keep a list of the medicines you take. How can you care for yourself at home? · Take your medicines exactly as prescribed. Call your doctor if you think you are having a problem with your medicine. · Avoid irritation. ? Do not squeeze or pick at the lump. ? Do not stick a needle in it. · Prevent infection. Do not squeeze, drain, or puncture a painful lump. Doing this can irritate or inflame the lump, push any existing infection deeper into the skin, or cause severe bleeding. · Get extra rest. Slow down just a little from your usual routine. · Drink plenty of fluids, enough so that your urine is light yellow or clear like water. If you have kidney, heart, or liver disease and have to limit fluids, talk with your doctor before you increase the amount of fluids you drink. · Take an over-the-counter pain medicine, such as acetaminophen (Tylenol), ibuprofen (Advil, Motrin), or naproxen (Aleve). Read and follow all instructions on the label. · Do not take two or more pain medicines at the same time unless the doctor told you to. Many pain medicines have acetaminophen, which is Tylenol. Too much acetaminophen (Tylenol) can be harmful. When should you call for help? Call your doctor now or seek immediate medical care if:    · You have worse symptoms of infection, such as:  ? Increased pain, swelling, warmth, or redness. ? Red streaks leading from the area. ? Pus draining from the area. ? A fever. Watch closely for changes in your health, and be sure to contact your doctor if:    · Your lymph nodes do not get smaller or do not return to normal.     · You do not get better as expected. Where can you learn more? Go to https://DramaFever.CloudPhysics. org and sign in to your [employer] account. Enter O331 in the ColonaryConcepts box to learn more about \"Swollen Lymph Nodes: Care Instructions. \"     If you do not have an account, please click on the \"Sign Up Now\" link. Current as of: February 11, 2020               Content Version: 12.6  © 2006-2020 [employer]ana[employer], Incorporated. Care instructions adapted under license by [facility] ([facility]). If you have questions about a medical condition or this instruction, always ask your healthcare professional. [place] [address] any warranty or liability for your use of this information. Patient Education        Lymphadenitis: Care Instructions  Your Care Instructions  Lymph nodes are small, bean-shaped glands throughout the body. They help the body fight germs and infections. Lymphadenitis is a swelling of a lymph node. It can be caused by an infection or other condition. The infection is most often in a nearby part of the body. A common example is the lumps on both sides of your neck under the jaw that get tender and bigger when you have a cold or sore throat. Sometimes the lymph node itself may be infected. Usually the swollen lymph nodes go back to normal size without a problem. Treatment, if needed, focuses on treating the cause. For example, a bacterial infection may be treated with antibiotics. This should bring the node back to normal size. An infection caused by a virus often goes away on its own. In rare cases, a badly infected node may need to be drained by your doctor. Follow-up care is a key part of your treatment and safety. Be sure to make and go to all appointments, and call your doctor if you are having problems. It's also a good idea to know your test results and keep a list of the medicines you take. How can you care for yourself at home? · Be safe with medicines. ? If your doctor prescribed antibiotics, take them as directed. Do not stop taking them just because you feel better. You need to take the full course of antibiotics. ? Ask your doctor if you can take an over-the-counter pain medicine, such as acetaminophen (Tylenol), ibuprofen (Advil, Motrin), or naproxen (Aleve). Read and follow all instructions on the label. · If you have pain, try a warm compress. Soak a towel or washcloth in warm water. Wring it out, and place it on the affected skin. · Do not squeeze, drain, or puncture a painful lump. Doing this can irritate or inflame the lump, push any existing infection deeper into the skin, or cause severe bleeding. When should you call for help? Call your doctor now or seek immediate medical care if:    · Your lymph nodes get bigger.     · The area becomes red and feels more tender.     · You have a fever that does not go away. Watch closely for changes in your health, and be sure to contact your doctor if:    · You do not get better as expected. Where can you learn more? Go to https://chpepiceweb.Lightera. org and sign in to your [employer] account. Enter X955 in the WedPics (deja mi)hire box to learn more about \"Lymphadenitis: Care Instructions. \"     If you do not have an account, please click on the \"Sign Up Now\" link. Current as of: February 11, 2020               Content Version: 12.6  © 2006-2020 [employer]. Care instructions adapted under license by [facility] ([facility]). If you have questions about a medical condition or this instruction, always ask your healthcare professional. [address] any warranty or liability for your use of this information. Patient Education        Mammogram: About This Test  What is it? A mammogram is an X-ray of the breast that is used to screen for breast cancer. This test can find tumors that are too small for you or your doctor to feel. Cancer is most easily treated when it is found at an early stage. Why is this test done? A mammogram is done to:  · Look for breast cancer in women who don't have symptoms. · Find breast cancer in women who have symptoms. Symptoms of breast cancer may include a lump or thickening in the breast, nipple discharge, or dimpling of the skin on one area of the breast.  · Find an area of suspicious breast tissue to remove for an exam under a microscope (biopsy). How do you prepare for the test?  If you've had a mammogram before at another clinic, have the results sent or bring them with you to your appointment. On the day of the mammogram, don't use any deodorant. And don't use perfume, powders, or ointments near or on your breasts. The residue left on your skin by these substances may interfere with the X-rays. How is the test done? · You will need to take off any jewelry that might interfere with the X-ray pictures.   · You will need to take off your clothes above the waist. · You will be given a cloth or paper gown to use during the test.  · You probably will stand during the mammogram.  · One at a time, your breasts will be placed on a flat plate. · Another plate is then pressed firmly against your breast to help flatten out the breast tissue. You may be asked to lift your arm. · For a few seconds while the X-ray picture is being taken, you will need to hold your breath. · At least two pictures are taken of each breast. One is taken from the top and one from the side. How does having a mammogram feel? A mammogram is often uncomfortable but rarely painful. If you have sensitive or fragile skin or a skin condition, let the technician know before you have your exam. If you have menstrual periods, the procedure is more comfortable when done within 2 weeks after your period has ended. Having your breasts flattened is usually uncomfortable, but it helps the technician get the best images. How long does the test take? · The test will take about 10 to 15 minutes. You may be in the clinic for up to an hour. · You may be asked to wait a few minutes while the images are checked to make sure they don't need to be redone. What happens after the test?  · You will probably be able to go home right away. · You can go back to your usual activities right away. Follow-up care is a key part of your treatment and safety. Be sure to make and go to all appointments, and call your doctor if you are having problems. It's also a good idea to keep a list of the medicines you take. Ask your doctor when you can expect to have your test results. Where can you learn more? Go to https://[email or web address].Alice.com. org and sign in to your [employer] account. Enter P887 in the Home Comfort Zones box to learn more about \"Mammogram: About This Test.\"     If you do not have an account, please click on the \"Sign Up Now\" link.   Current as of: April 29, 2020               Content Version: 12.6 © 3037-4073 Healthwise, Incorporated. Care instructions adapted under license by [facility] ([facility]). If you have questions about a medical condition or this instruction, always ask your healthcare professional. [address] any warranty or liability for your use of this information.

## 2021-01-16 LAB — TSH REFLEX: 2.99 UIU/ML (ref 0.27–4.2)

## 2021-01-18 ENCOUNTER — TELEPHONE (OUTPATIENT)
Dept: FAMILY MEDICINE CLINIC | Age: 56
End: 2021-01-18

## 2021-01-18 DIAGNOSIS — Z80.3 FAMILY HISTORY OF BREAST CANCER: ICD-10-CM

## 2021-01-18 DIAGNOSIS — R59.0 AXILLARY LYMPHADENOPATHY: Primary | ICD-10-CM

## 2021-01-18 NOTE — TELEPHONE ENCOUNTER
Called and spoke with pt and advised of lab results,    would it be okay for her to have a firmagram? Done instead of a mamogram

## 2021-01-19 NOTE — TELEPHONE ENCOUNTER
Called and spoke with pt and she really does not want to get the mammogram done stated that she has had tissue damage from it and does not want to get any more kind of radiation. [place] and has been exposed to a lot of radiation.  Hs

## 2021-01-22 ENCOUNTER — HOSPITAL ENCOUNTER (OUTPATIENT)
Dept: ULTRASOUND IMAGING | Age: 56
Discharge: HOME | End: 2021-01-22
Payer: COMMERCIAL

## 2021-01-22 DIAGNOSIS — I88.9 AXILLARY LYMPHADENITIS: ICD-10-CM

## 2021-01-22 DIAGNOSIS — Z80.3 FAMILY HISTORY OF BREAST CANCER: ICD-10-CM

## 2021-01-22 NOTE — TELEPHONE ENCOUNTER
Looks like ultrasound for the axilla was placed 1/15, and ultrasound of the breast was ordered 1/19. So should be good.

## 2021-02-02 ENCOUNTER — HOSPITAL ENCOUNTER (OUTPATIENT)
Dept: ULTRASOUND IMAGING | Age: 56
Discharge: HOME | End: 2021-02-02
Payer: COMMERCIAL

## 2021-02-02 ENCOUNTER — CLINICAL DOCUMENTATION (OUTPATIENT)
Dept: WOMENS IMAGING | Age: 56
End: 2021-02-02

## 2021-02-02 DIAGNOSIS — Z80.3 FAMILY HISTORY OF BREAST CANCER: ICD-10-CM

## 2021-02-02 DIAGNOSIS — R59.0 AXILLARY LYMPHADENOPATHY: ICD-10-CM

## 2021-02-02 PROCEDURE — 76641 ULTRASOUND BREAST COMPLETE: CPT

## 2021-02-02 NOTE — PROGRESS NOTES
Pt here for US and in process, identified many family members with breast/other cancers. Pt reluctant to have mammogram due to radiation exposure and her personal history of growing up near the [employer] [employer] [place] Pt receptive to meeting with NP, high risk breast cancer program for risk assessment and potential consideration - offered referral and pt is interested. Given information and will contact office for f/u.

## 2021-02-03 DIAGNOSIS — R59.0 AXILLARY LYMPHADENOPATHY: ICD-10-CM

## 2021-02-03 DIAGNOSIS — Z80.3 FAMILY HISTORY OF BREAST CANCER: Primary | ICD-10-CM

## 2021-02-25 ENCOUNTER — TELEPHONE (OUTPATIENT)
Dept: SURGERY | Age: 56
End: 2021-02-25

## 2021-02-25 NOTE — TELEPHONE ENCOUNTER
Spoke to patient about new patient intake form and confirm her appointment for tomorrow 2/26/21 at 9:30 am with Varsha Rebolledo CNP in our [place] location. Address given as requested.

## 2021-02-26 ENCOUNTER — OFFICE VISIT (OUTPATIENT)
Dept: SURGERY | Age: 56
End: 2021-02-26
Payer: COMMERCIAL

## 2021-02-26 VITALS
WEIGHT: 168.2 LBS | DIASTOLIC BLOOD PRESSURE: 74 MMHG | BODY MASS INDEX: 27.03 KG/M2 | OXYGEN SATURATION: 98 % | RESPIRATION RATE: 18 BRPM | SYSTOLIC BLOOD PRESSURE: 128 MMHG | HEART RATE: 72 BPM | TEMPERATURE: 97.2 F | HEIGHT: 66 IN

## 2021-02-26 DIAGNOSIS — Z12.39 ENCOUNTER FOR SCREENING BREAST EXAMINATION: ICD-10-CM

## 2021-02-26 DIAGNOSIS — Z80.3 FAMILY HISTORY OF BREAST CANCER: ICD-10-CM

## 2021-02-26 DIAGNOSIS — Z91.89 AT HIGH RISK FOR BREAST CANCER: Primary | ICD-10-CM

## 2021-02-26 PROCEDURE — 99204 OFFICE O/P NEW MOD 45 MIN: CPT | Performed by: NURSE PRACTITIONER

## 2021-02-26 NOTE — PROGRESS NOTES
[facility] ([facility])   Surgical Breast Oncology     Primary Care Provider:     CC: High Risk for Breast Cancer      Brien Singh is being seen at the request of of [facility] for a consultation for high risk breast.    HPI:  Brien Singh is a 48 y. o. woman here for evaluation of her risk for breast cancer. Overall doing well and has no breast related concerns or changes in her health. She did have right upper extremity swelling, left axillary U/S was normal.  She states that she does perform routine self breast evaluations and has not noticed any new abnormalities such as masses, skin changes, color changes,nipple discharge, or changes to the nipple-areolar complex. Her family cancer history is significant for breast cancer in her mother, aunt, sister. . She has not a breast biopsy in the past.     She is reluctant to have mammogram due to radiation exposure and her personal history of growing up near the [employer]. Diet: eats diets that help to decrease chemicals, ions, and metals in her body. Organic foods, no processed foods. Celery juice every morning  Alcohol: none    Exercise: walks, [place] [employer]  Sleep: good     INTERVAL HISTORY:  Bilateral screening mammogram 2017 @[facility]:  Breasts are heterogeneously dense. No suspicious findings suggestive of malignancy. BI-RADS 1. Right breast and axilla ultrasound 2021:  No suspicious findings or abnormal lymph nodes identified in the area of concern in the right axilla. BI-RADS 1. Past Medical History:   Diagnosis Date    Family history of breast cancer     mother in 46s    Osteopenia     Tension headache     Vitamin D deficiency        Past Surgical History:   Procedure Laterality Date     SECTION      COLONOSCOPY  2018    COLONOSCOPY, DR Mackenzie Da Silva, TERMINAL ILEUM NORMAL AND NORMAL COLONOSCOPY. REPEAT IN 10 YRS.  [facility]    MAXILLECTOMY  2009    jaw overbite    WISDOM TOOTH EXTRACTION      X 4 IN          Menstrual History:  Menarche age 16.   Age first live birth 29  Breastfeeding Yes for 2 months total   Postmenopausal, age 49  Oral contraceptives yes for 2 weeks then stopped   Hormone replacement No     Breast density: heterogeneously dense   Ashkenazi Jainism Heritage: no   Genetic testing: none     Family history significant for breast and ovarian cancer:    Mother, breast cancer 2-3 times, DX unknown, lymphoma, kidney cancer,  age 80  Sister, breast cancer, DX 62, living  Maternal aunt, breast cancer, DX unknown  Maternal grandmother, breast cancer, DX 80,   *a lot of family cancer is reported but specifics are unknown       Family History   Problem Relation Age of Onset    Breast Cancer Mother          age 80 met ds,in , hx of lymphoma also age 36    High Blood Pressure Mother    Polly Cushing Cancer Father         gastric , age 68   Johanna Cushing No Known Problems Sister     No Known Problems Sister     No Known Problems Sister     Colon Cancer Maternal Grandmother     ADHD Son        Allergies as of 2021 - Review Complete 2021   Allergen Reaction Noted    Latex  2018    Caffeine Other (See Comments) 2020    Sulfa antibiotics Rash 2019       Social History     Tobacco Use    Smoking status: Never Smoker    Smokeless tobacco: Never Used   Substance Use Topics    Alcohol use: No     Alcohol/week: 0.0 standard drinks    Drug use: No         Current Outpatient Medications:     vitamin D (CHOLECALCIFEROL) 1000 units TABS tablet, Take 2,000 Units by mouth daily, Disp: , Rfl:     SUMAtriptan (IMITREX) 50 MG tablet, Take 1 tablet by mouth daily as needed for Migraine may repeat in 1 hr if no relief of acute migraine (Patient not taking: Reported on 1/15/2021), Disp: 9 tablet, Rfl: 0    butalbital-acetaminophen-caffeine (FIORICET, ESGIC) -40 MG per tablet, Take 1 tablet by mouth every 4 hours as light.   Neck: Neck supple. No tracheal deviation present. No obvious mass. Cardiovascular: regular rate. Pulmonary: No accessory muscle use. Respirations non-labored and no wheezing. Lymphatics: No palpable supraclavicular, cervical, or axillary lymphadenopathy  Skin: No rash noted. No erythema. Neurologic: alert and oriented. Extremities: appear well perfused. No edema. No joint deformity         Risk assessment using EMA Breast Cancer Risk Evaluation Tool to evaluate her risk compared to the general population. Her lifetime risk for breast cancer is 34.4% (general population average 10.5%). ASSESSMENT:  - High Risk for Breast Cancer based on increased risk profile for breast cancer. Tyrer-Debbieck (EMA) 8.0 risk estimate calculated at  34.5% today (>20% is considered high risk). - Screening Breast Examination   - Family History of Breast Cancer       PLAN:    1. Surveillance: We discussed the NCCN guidelines for high risk surveillance. This includes annual screening mammography beginning 10 years prior to youngest affected family member (but not before age 27), annual screening MRI beginning 10 years prior to youngest affected family member (but not before age 22), and clinical breast exams every 6-12 months. We also stressed the importance of breast awareness. - Bilateral screening mammogram is over due, last mammogram 2017. Reluctant to have imaging due to radiation exposure. Long discussion about exposure and other risks. Patient is not willing to schedule at this time but will continue to think about it. - Bilateral breast MRI due now. Similar to mammogram and radiation exposure patient declines to have MRI due to gadolinium contrast exposure and update. Long discussion and education provided about risk. Patient will continue to think about it. She is more inclined to have the MRI vs Mammogram.    - Clinical breast exam recommended every 6 months.   Patient is agreeable and interested in having a clinical screening breast exam preformed every 6 months. Due 9/2021       3. Medical Oncology: We discussed the role of chemoprophylaxis in women with an increased risk of breast cancer. At this time Ms. Hoendorf expresses that she is not interested in antiestrogen therapy. We will refer her to medical oncology should she wish to revisit this in the future. 4. Referral for Genetic Counseling - she is interested in testing but would like time to consider and discuss again at next visit. 5. Education provided for Healthy Lifestyle Recommendations: healthy diet (decrease consumption of red meat, increase fresh fruits and vegetables), decreased alcohol consumption, adequate sleep (goal 6-8 hours), routine exercise (goal 150 minutes/week or greater), weight control. 6.  Most recent breast imaging was reviewed, discussed with the patient and documented above. CHRISTIANA Blandon-[facility])   Surgical Breast Oncology   [phone]      All of the patient's questions were answered at this time however, she was encouraged to call the office with any further inquiries. Approximately 50 minutes of time were spent in this visit of which 50% or more of the time was related to coordination of care.

## 2021-08-30 ENCOUNTER — OFFICE VISIT (OUTPATIENT)
Dept: SURGERY | Age: 56
End: 2021-08-30
Payer: COMMERCIAL

## 2021-08-30 VITALS
DIASTOLIC BLOOD PRESSURE: 70 MMHG | TEMPERATURE: 96.2 F | SYSTOLIC BLOOD PRESSURE: 116 MMHG | OXYGEN SATURATION: 98 % | HEART RATE: 70 BPM | HEIGHT: 66 IN | WEIGHT: 165 LBS | RESPIRATION RATE: 18 BRPM | BODY MASS INDEX: 26.52 KG/M2

## 2021-08-30 DIAGNOSIS — Z80.3 FAMILY HISTORY OF BREAST CANCER: ICD-10-CM

## 2021-08-30 DIAGNOSIS — Z12.39 ENCOUNTER FOR SCREENING BREAST EXAMINATION: ICD-10-CM

## 2021-08-30 DIAGNOSIS — Z91.89 AT HIGH RISK FOR BREAST CANCER: Primary | ICD-10-CM

## 2021-08-30 PROCEDURE — 99213 OFFICE O/P EST LOW 20 MIN: CPT | Performed by: NURSE PRACTITIONER

## 2021-08-30 ASSESSMENT — ENCOUNTER SYMPTOMS
COUGH: 0
ABDOMINAL PAIN: 0
SHORTNESS OF BREATH: 0

## 2021-08-30 NOTE — PATIENT INSTRUCTIONS
overlap. ? Use three levels of pressure to feel of all your breast tissue. Use light pressure to feel the tissue close to the skin surface. Use medium pressure to feel a little deeper. Use firm pressure to feel your tissue close to your breastbone and ribs. Use each pressure level to feel your breast tissue before moving on to the next spot. ? Check your entire breast, moving up and down as if following a strip from the collarbone to the bra line, and from the armpit to the ribs. Repeat until you have covered the entire breast.  ? Repeat this procedure for your left breast, using the pads of the 3 middle fingers of your right hand. · To examine your breasts while in the shower:  ? Place one arm over your head and lightly soap your breast on that side. ? Using the pads of your fingers, gently move your hand over your breast (in the strip pattern described above), feeling carefully for any lumps or changes. ? Repeat for the other breast.  · Have your doctor inspect anything you notice to see if you need further testing. Where can you learn more? Go to [email or web address] org and sign in to your [employer] account. Enter P148 in the [employer] box to learn more about \"Breast Self-Exam: Care Instructions. \"     If you do not have an account, please click on the \"Sign Up Now\" link. Current as of: December 17, 2020               Content Version: 12.9  © 9849-1378 Healthwise, Incorporated. Care instructions adapted under license by [facility] ([facility]). If you have questions about a medical condition or this instruction, always ask your healthcare professional. [place] [address] any warranty or liability for your use of this information.

## 2021-08-30 NOTE — PROGRESS NOTES
[facility] ([facility])   Surgical Breast Oncology     CC: High Risk for Breast Cancer      HPI:  Buddy Reinoso is a 55 y.o. woman here for routine follow up for high risk for breast cancer. Overall doing well and has no breast related concerns or changes in her health. She states that she does perform routine self breast evaluations and has not noticed any new abnormalities such as masses, skin changes, color changes,nipple discharge, or changes to the nipple-areolar complex. Her family cancer history is significant for breast cancer in her mother, aunt, and sister. She has not had a breast biopsy in the past.     She is reluctant to have mammograms and MRIs due to radiation and gadolinium exposure and her personal history of growing up near the [place]. She has had a lot of stress recently, daughter graduating from college and moving out of town to [place]. Diet: eats diets that help to decrease chemicals, ions, and metals in her body. Organic foods, no processed foods. Celery juice every morning  Alcohol: none    Exercise: walks, prior to Nikole went [facility] the [facility], thinking about going back  Sleep: good     INTERVAL HISTORY:  Bilateral screening mammogram 2017 @[facility]:  Breasts are heterogeneously dense. No suspicious findings suggestive of malignancy. BI-RADS 1. Right breast and axilla ultrasound 2021:  No suspicious findings or abnormal lymph nodes identified in the area of concern in the right axilla. BI-RADS 1. Past Medical History:   Diagnosis Date    Family history of breast cancer     mother in 46s    Osteopenia     Tension headache     Vitamin D deficiency        Past Surgical History:   Procedure Laterality Date     SECTION      COLONOSCOPY  2018    COLONOSCOPY, DR Won Su, TERMINAL ILEUM NORMAL AND NORMAL COLONOSCOPY. REPEAT IN 10 YRS.  [facility]    MAXILLECTOMY  2009    jaw overbite    WISDOM TOOTH EXTRACTION      X 4 IN  Menstrual History:  Menarche age 16.   Age first live birth 29  Breastfeeding Yes for 2 months total   Postmenopausal, age 49  Oral contraceptives yes for 2 weeks then stopped   Hormone replacement No     Breast density: heterogeneously dense   Ashkenazi Hindu Heritage: no   Genetic testing: none     Family history significant for breast and ovarian cancer:    Mother, breast cancer 2-3 times, DX unknown, lymphoma, kidney cancer,  age 80  Sister, breast cancer, DX 62, living  Maternal aunt, breast cancer, DX unknown  Maternal grandmother, breast cancer, DX 80,   *a lot of family cancer is reported but specifics are unknown       Family History   Problem Relation Age of Onset    Breast Cancer Mother          age 80 met ds,in 2013, hx of lymphoma also age 36    High Blood Pressure Mother    Dania Saldivar Cancer Father         gastric , age 68   Dania Saldivar No Known Problems Sister     No Known Problems Sister     No Known Problems Sister     Colon Cancer Maternal Grandmother     ADHD Son        Allergies as of 2021 - Fully Reviewed 2021   Allergen Reaction Noted    Latex  2018    Caffeine Other (See Comments) 2020    Sulfa antibiotics Rash 2019       Social History     Tobacco Use    Smoking status: Never Smoker    Smokeless tobacco: Never Used   Vaping Use    Vaping Use: Never used   Substance Use Topics    Alcohol use: No     Alcohol/week: 0.0 standard drinks    Drug use: No         Current Outpatient Medications:     vitamin D (CHOLECALCIFEROL) 1000 units TABS tablet, Take 2,000 Units by mouth daily, Disp: , Rfl:     SUMAtriptan (IMITREX) 50 MG tablet, Take 1 tablet by mouth daily as needed for Migraine may repeat in 1 hr if no relief of acute migraine (Patient not taking: Reported on 2021), Disp: 9 tablet, Rfl: 0    butalbital-acetaminophen-caffeine (FIORICET, ESGIC) -40 MG per tablet, Take 1 tablet by mouth every 4 hours as needed for Oncology: We discussed the role of chemoprophylaxis in women with an increased risk of breast cancer. At this time Ms. Hoendorf expresses that she is not interested in antiestrogen therapy. We will refer her to medical oncology should she wish to revisit this in the future. 4. Referral for Genetic Counseling - she is interested in testing but would like time to consider and discuss again at next visit. 5. Education provided for Healthy Lifestyle Recommendations: healthy diet (decrease consumption of red meat, increase fresh fruits and vegetables), decreased alcohol consumption, adequate sleep (goal 6-8 hours), routine exercise (goal 150 minutes/week or greater), weight control. Stress reduction techniques discussed      6. Most recent breast imaging was reviewed, discussed with the patient and documented above. CHRISTIANA Koo-[facility])   Surgical Breast Oncology   [phone]      All of the patient's questions were answered at this time however, she was encouraged to call the office with any further inquiries. Approximately 20 minutes of time were spent in preparation, direct patient contact, counseling, care coordination, documentation and activities otherwise related to this encounter.

## 2022-02-28 ENCOUNTER — OFFICE VISIT (OUTPATIENT)
Dept: SURGERY | Age: 57
End: 2022-02-28
Payer: COMMERCIAL

## 2022-02-28 VITALS
BODY MASS INDEX: 26.93 KG/M2 | SYSTOLIC BLOOD PRESSURE: 118 MMHG | HEART RATE: 73 BPM | TEMPERATURE: 96.7 F | WEIGHT: 167.6 LBS | RESPIRATION RATE: 18 BRPM | DIASTOLIC BLOOD PRESSURE: 74 MMHG | OXYGEN SATURATION: 99 % | HEIGHT: 66 IN

## 2022-02-28 DIAGNOSIS — Z12.39 ENCOUNTER FOR SCREENING BREAST EXAMINATION: ICD-10-CM

## 2022-02-28 DIAGNOSIS — Z80.3 FAMILY HISTORY OF BREAST CANCER: ICD-10-CM

## 2022-02-28 DIAGNOSIS — Z91.89 AT HIGH RISK FOR BREAST CANCER: Primary | ICD-10-CM

## 2022-02-28 PROCEDURE — 99213 OFFICE O/P EST LOW 20 MIN: CPT | Performed by: NURSE PRACTITIONER

## 2022-02-28 ASSESSMENT — ENCOUNTER SYMPTOMS
ABDOMINAL PAIN: 0
COUGH: 0
SHORTNESS OF BREATH: 0

## 2022-02-28 NOTE — PROGRESS NOTES
[facility])   Surgical Breast Oncology     CC: High Risk for Breast Cancer      HPI:  Kaycee Serrano is a 55 y.o. woman here for routine follow up for high risk for breast cancer. Overall doing well and has no breast related concerns or changes in her health. She states that she does perform routine self breast evaluations and has not noticed any new abnormalities such as masses, skin changes, color changes,nipple discharge, or changes to the nipple-areolar complex. Her family cancer history is significant for breast cancer in her mother, aunt, and sister. She has not had a breast biopsy in the past.     She is reluctant to have mammograms and MRIs due to radiation and gadolinium exposure and her personal history of growing up near the [employer]. Diet: eats diets that help to decrease chemicals, ions, and metals in her body. Organic foods, no processed foods. Celery juice every morning  Alcohol: none    Exercise: walks, runs, push ups; prior to Jody Butler went to the [facility],  Sleep: good     INTERVAL HISTORY:  Bilateral screening mammogram 2017 @[facility]:  Breasts are heterogeneously dense. No suspicious findings suggestive of malignancy. BI-RADS 1. Right breast and axilla ultrasound 2021:  No suspicious findings or abnormal lymph nodes identified in the area of concern in the right axilla. BI-RADS 1. Past Medical History:   Diagnosis Date    Family history of breast cancer     mother in 46s    Osteopenia     Tension headache     Vitamin D deficiency        Past Surgical History:   Procedure Laterality Date     SECTION      COLONOSCOPY  2018    COLONOSCOPY, DR Dallas Valentin, TERMINAL ILEUM NORMAL AND NORMAL COLONOSCOPY. REPEAT IN [facility] YRS. [facility]    MAXILLECTOMY  2009    jaw overbite    WISDOM TOOTH EXTRACTION      X 4 IN          Menstrual History:  Menarche age 16.      Age first live birth 29  Breastfeeding Yes for 2 months total Postmenopausal, age 49  Oral contraceptives yes for 2 weeks then stopped   Hormone replacement No     Breast density: heterogeneously dense   Ashkenazi Islam Heritage: no   Genetic testing: none     Family history significant for breast and ovarian cancer: Mother, breast cancer 2-3 times, DX unknown, lymphoma, kidney cancer,  age 80  Sister, breast cancer, DX 62, living  Maternal aunt, breast cancer, DX unknown  Maternal grandmother, breast cancer, DX 80,   *a lot of family cancer is reported but specifics are unknown       Family History   Problem Relation Age of Onset    Breast Cancer Mother          age 80 met ds,in , hx of lymphoma also age 36    High Blood Pressure Mother    Rayn Porch Cancer Father         gastric , age 68   Ryan Porch No Known Problems Sister     No Known Problems Sister     No Known Problems Sister     Colon Cancer Maternal Grandmother     ADHD Son        Allergies as of 2022 - Fully Reviewed 2022   Allergen Reaction Noted    Latex  2018    Caffeine Other (See Comments) 2020    Sulfa antibiotics Rash 2019       Social History     Tobacco Use    Smoking status: Never Smoker    Smokeless tobacco: Never Used   Vaping Use    Vaping Use: Never used   Substance Use Topics    Alcohol use: No     Alcohol/week: 0.0 standard drinks    Drug use: No         Current Outpatient Medications:     SUMAtriptan (IMITREX) 50 MG tablet, Take 1 tablet by mouth daily as needed for Migraine may repeat in 1 hr if no relief of acute migraine (Patient not taking: Reported on 2021), Disp: 9 tablet, Rfl: 0    butalbital-acetaminophen-caffeine (FIORICET, ESGIC) -40 MG per tablet, Take 1 tablet by mouth every 4 hours as needed for Headaches or Migraine (Patient not taking: Reported on 2021), Disp: 20 tablet, Rfl: 3    topiramate (TOPAMAX) 50 MG tablet, Take 1 tab nightly for 4 nights, and then take 1 tab twice daily.  (Patient not taking: Reported on 8/30/2021), Disp: 60 tablet, Rfl: 3    vitamin D (CHOLECALCIFEROL) 1000 units TABS tablet, Take 2,000 Units by mouth daily (Patient not taking: Reported on 2/28/2022), Disp: , Rfl:     calcium carbonate 600 MG TABS tablet, Take 1 tablet by mouth daily (Patient not taking: Reported on 8/30/2021), Disp: , Rfl:       Medications: documentation has been reviewed in the electronic medical record and patient office intake form. REVIEW OF SYSTEMS:  Constitutional: Negative for unexpected weight change. Eyes: Negative for visual disturbance. Respiratory: Negative for cough and shortness of breath. Cardiovascular: Negative for chest pain. Gastrointestinal: Negative for abdominal pain. Musculoskeletal: Negative for arthralgias and myalgias. Neurological: Negative for headaches. Hematological: Negative for adenopathy. Psychiatric/Behavioral: Negative for dysphoric mood. The patient is not nervous/anxious. PHYSICAL EXAM:  /74   Pulse 73   Temp 96.7 °F (35.9 °C)   Resp 18   Ht 5' 5.5\" (1.664 m)   Wt 167 lb 9.6 oz (76 kg)   SpO2 99%   BMI 27.47 kg/m²   Constitutional: She appearswell-nourished. No apparent distress. Breast: The patient was examined in the upright and supine position. Breasts are symmetrically ptotic. Right: No new masses or changes in breast contour. No skin changes of the breast or nipple areolar complex. No nipple inversion or discharge. No erythema, thickening (peau d'orange), or dimpling. Left: No new masses or changes in breast contour. No skin changes of the breast or nipple areolar complex. No nipple inversion or discharge. No erythema, thickening (peau d'orange), or dimpling. There is no axillary lymphadenopathy palpated bilaterally. Head: Normocephalic and atraumatic  Eyes: EOM are normal. Pupils are equal, round, and reactive to light. Neck: Neck supple. No tracheal deviation present. No obvious mass.   Lymphatics: No palpable supraclavicular, cervical, or axillary lymphadenopathy  Skin: No rash noted. No erythema. Neurologic: alert and oriented. Extremities: appear well perfused. Risk assessment using EMA Breast Cancer Risk Evaluation Tool to evaluate her risk compared to the general population. Her lifetime risk for breast cancer is 34.4% (general population average 10.5%). ASSESSMENT:  - High Risk for Breast Cancer based on increased risk profile for breast cancer. Digna (EMA) 8.0 risk estimate calculated at  34.5% today (>20% is considered high risk). - Screening Breast Examination   - Family History of Breast Cancer       PLAN:    1. Surveillance: We discussed the NCCN guidelines for high risk surveillance. This includes annual screening mammography beginning 10 years prior to youngest affected family member (but not before age 27), annual screening MRI beginning 10 years prior to youngest affected family member (but not before age 22), and clinical breast exams every 6-12 months. We also stressed the importance of breast awareness. - Bilateral screening mammogram is over due, last mammogram 2017. Reluctant to have imaging due to radiation exposure. Long discussion about exposure and other risks. Patient is not willing to schedule at this time but will continue to think about it. - Bilateral breast MRI due now. Similar to mammogram and radiation exposure patient declines to have MRI due to gadolinium contrast exposure uptake. Long discussion and education provided about risk. Patient will continue to think about it. She is more inclined to have the MRI vs Mammogram.    - Clinical breast exam recommended every 6 months. Patient is agreeable and interested in having a clinical screening breast exam preformed every 6 months. Due 3/2022       3. Medical Oncology: We discussed the role of chemoprophylaxis in women with an increased risk of breast cancer. At this time Ms. Hoendorf expresses that she is not interested in antiestrogen therapy. We will refer her to medical oncology should she wish to revisit this in the future. 4. Referral for Genetic Counseling - she is interested in testing but would like time to consider and discuss again at next visit. 5. Education provided for Healthy Lifestyle Recommendations: healthy diet (decrease consumption of red meat, increase fresh fruits and vegetables), decreased alcohol consumption, adequate sleep (goal 6-8 hours), routine exercise (goal 150 minutes/week or greater), weight control. Stress reduction techniques discussed      6. Most recent breast imaging was reviewed, discussed with the patient and documented above. CHRISTIANA Monk-CNP  [facility])   Surgical Breast Oncology   [phone]      All of the patient's questions were answered at this time however, she was encouraged to call the office with any further inquiries. Approximately 20 minutes of time were spent in preparation, direct patient contact, counseling, care coordination, documentation and activities otherwise related to this encounter.

## 2022-02-28 NOTE — PATIENT INSTRUCTIONS
Healthy Lifestyle Recommendations: healthy diet (decrease consumption of red meat, increase fresh fruits and vegetables), decreased alcohol consumption (less than 4 drinks/week), adequate sleep (goal 6-8 hours), routine exercise (goal 150 minutes/week or greater), weight control. Patient Education        Breast Self-Exam: Care Instructions  Your Care Instructions     A breast self-exam is when you check your breasts for lumps or changes. This regular exam helps you learn how your breasts normally look and feel. Most breast problems or changes are not because of cancer. Breast self-exam is not a substitute for a mammogram. Having regular breast exams by your doctor and regular mammograms improve your chances of finding any problems with your breasts. Some women set a time each month to do a step-by-step breast self-exam. Other women like a less formal system. They might look at their breasts as they brush their teeth, or feel their breasts once in a while in the shower. If you notice a change in your breast, tell your doctor. Follow-up care is a key part of your treatment and safety. Be sure to make and go to all appointments, and call your doctor if you are having problems. It's also a good idea to know your test results and keep a list of the medicines you take. How do you do a breast self-exam?  · The best time to examine your breasts is usually one week after your menstrual period begins. Your breasts should not be tender then. If you do not have periods, you might do your exam on a day of the month that is easy to remember. · To examine your breasts:  ? Remove all your clothes above the waist and lie down. When you are lying down, your breast tissue spreads evenly over your chest wall, which makes it easier to feel all your breast tissue. ?  Use the padsnot the fingertipsof the 3 middle fingers of your left hand to check your right breast. Move your fingers slowly in small coin-sized circles that overlap. ? Use three levels of pressure to feel of all your breast tissue. Use light pressure to feel the tissue close to the skin surface. Use medium pressure to feel a little deeper. Use firm pressure to feel your tissue close to your breastbone and ribs. Use each pressure level to feel your breast tissue before moving on to the next spot. ? Check your entire breast, moving up and down as if following a strip from the collarbone to the bra line, and from the armpit to the ribs. Repeat until you have covered the entire breast.  ? Repeat this procedure for your left breast, using the pads of the 3 middle fingers of your right hand. · To examine your breasts while in the shower:  ? Place one arm over your head and lightly soap your breast on that side. ? Using the pads of your fingers, gently move your hand over your breast (in the strip pattern described above), feeling carefully for any lumps or changes. ? Repeat for the other breast.  · Have your doctor inspect anything you notice to see if you need further testing. Where can you learn more? Go to https://ebindle.Blossom. org and sign in to your [employer] account. Enter P148 in the Notehall box to learn more about \"Breast Self-Exam: Care Instructions. \"     If you do not have an account, please click on the \"Sign Up Now\" link. Current as of: September 8, 2021               Content Version: 13.1  © 2006-2021 Healthwise, Incorporated. Care instructions adapted under license by [facility] ([facility]). If you have questions about a medical condition or this instruction, always ask your healthcare professional. [place] [address] any warranty or liability for your use of this information.

## 2022-08-29 ENCOUNTER — OFFICE VISIT (OUTPATIENT)
Dept: SURGERY | Age: 57
End: 2022-08-29
Payer: COMMERCIAL

## 2022-08-29 VITALS
BODY MASS INDEX: 27.45 KG/M2 | TEMPERATURE: 97.2 F | OXYGEN SATURATION: 98 % | HEIGHT: 66 IN | HEART RATE: 92 BPM | RESPIRATION RATE: 18 BRPM | WEIGHT: 170.8 LBS | DIASTOLIC BLOOD PRESSURE: 68 MMHG | SYSTOLIC BLOOD PRESSURE: 118 MMHG

## 2022-08-29 DIAGNOSIS — Z80.3 FAMILY HISTORY OF BREAST CANCER: ICD-10-CM

## 2022-08-29 DIAGNOSIS — Z12.39 ENCOUNTER FOR SCREENING BREAST EXAMINATION: ICD-10-CM

## 2022-08-29 DIAGNOSIS — Z91.89 AT HIGH RISK FOR BREAST CANCER: Primary | ICD-10-CM

## 2022-08-29 PROCEDURE — 99213 OFFICE O/P EST LOW 20 MIN: CPT | Performed by: NURSE PRACTITIONER

## 2022-08-29 NOTE — PATIENT INSTRUCTIONS
three levels of pressure to feel of all your breast tissue. Use light pressure to feel the tissue close to the skin surface. Use medium pressure to feel a little deeper. Use firm pressure to feel your tissue close to your breastbone and ribs. Use each pressure level to feel your breast tissue before moving on to the next spot. Check your entire breast, moving up and down as if following a strip from the collarbone to the bra line, and from the armpit to the ribs. Repeat until you have covered the entire breast.  Repeat this procedure for your left breast, using the pads of the 3 middle fingers of your right hand. To examine your breasts while in the shower:  Place one arm over your head and lightly soap your breast on that side. Using the pads of your fingers, gently move your hand over your breast (in the strip pattern described above), feeling carefully for any lumps or changes. Repeat for the other breast.  Have your doctor inspect anything you notice to see if you need further testing. Where can you learn more? Go to https://StemSave.Picklive. org and sign in to your [employer] account. Enter P148 in the [facility] box to learn more about \"Breast Self-Exam: Care Instructions. \"     If you do not have an account, please click on the \"Sign Up Now\" link. Current as of: September 8, 2021               Content Version: 13.3  © 4854-2038 Healthwise, Incorporated. Care instructions adapted under license by [facility] ([facility]). If you have questions about a medical condition or this instruction, always ask your healthcare professional. [place] [address] any warranty or liability for your use of this information.

## 2022-08-29 NOTE — PROGRESS NOTES
[facility] ([facility])   Surgical Breast Oncology     CC: High Risk for Breast Cancer      HPI:  Aidan Pinto is a 65 y.o. woman here for routine follow up for high risk for breast cancer. Overall doing well and has no breast related concerns or changes in her health. She states that she does perform routine self breast evaluations and has not noticed any new abnormalities such as masses, skin changes, color changes, nipple discharge, or changes to the nipple-areolar complex. Her family cancer history is significant for breast cancer in her mother, aunt, and sister. She has not had a breast biopsy in the past.     She is reluctant to have mammograms and MRIs due to radiation and gadolinium exposure and her personal history of [place] near the [employer]. Diet: eats diets that help to decrease chemicals, ions, and metals in her body. Organic foods, no processed foods. Celery juice every morning. Currently following a mediterranean diet   Alcohol: none    Exercise: walks, plays basketball at [facility]. Sleep: good     INTERVAL HISTORY:  Bilateral screening mammogram 2017 @[facility]:  Breasts are heterogeneously dense. No suspicious findings suggestive of malignancy. BI-RADS 1. Right breast and axilla ultrasound 2021:  No suspicious findings or abnormal lymph nodes identified in the area of concern in the right axilla. BI-RADS 1. Past Medical History:   Diagnosis Date    Family history of breast cancer     mother in 46s    Osteopenia     Tension headache     Vitamin D deficiency        Past Surgical History:   Procedure Laterality Date     SECTION      COLONOSCOPY  2018    COLONOSCOPY, DR Celine Sullivan, TERMINAL ILEUM NORMAL AND NORMAL COLONOSCOPY. REPEAT IN 10 YRS. [facility]    MAXILLECTOMY  2009    jaw overbite    WISDOM TOOTH EXTRACTION      X 4 IN          Menstrual History:  Menarche age 16.      Age first live birth 29  Breastfeeding Yes for 2 months total   Postmenopausal, age 49  Oral contraceptives yes for 2 weeks then stopped   Hormone replacement No     Breast density: heterogeneously dense   [employer]: no   Genetic testing: none     Family history significant for breast and ovarian cancer:    Mother, breast cancer 2-3 times, DX unknown, lymphoma, kidney cancer,  age 80  Sister, breast cancer, DX 62, living  Maternal aunt, breast cancer, DX unknown  Maternal grandmother, breast cancer, DX 80,   *a lot of family cancer is reported but specifics are unknown       Family History   Problem Relation Age of Onset    Breast Cancer Mother          age 80 met ds,in , hx of lymphoma also age 36    High Blood Pressure Mother     Cancer Father         gastric , age 68    No Known Problems Sister     No Known Problems Sister     No Known Problems Sister     Colon Cancer Maternal Grandmother     ADHD Son        Allergies as of 2022 - Fully Reviewed 2022   Allergen Reaction Noted    Latex  2018    Caffeine Other (See Comments) 2020    Sulfa antibiotics Rash 2019       Social History     Tobacco Use    Smoking status: Never    Smokeless tobacco: Never   Vaping Use    Vaping Use: Never used   Substance Use Topics    Alcohol use: No     Alcohol/week: 0.0 standard drinks    Drug use: No         Current Outpatient Medications:     vitamin D (CHOLECALCIFEROL) 1000 units TABS tablet, Take 2,000 Units by mouth daily, Disp: , Rfl:     SUMAtriptan (IMITREX) 50 MG tablet, Take 1 tablet by mouth daily as needed for Migraine may repeat in 1 hr if no relief of acute migraine (Patient not taking: Reported on 2021), Disp: 9 tablet, Rfl: 0    butalbital-acetaminophen-caffeine (FIORICET, ESGIC) -40 MG per tablet, Take 1 tablet by mouth every 4 hours as needed for Headaches or Migraine (Patient not taking: Reported on 2021), Disp: 20 tablet, Rfl: 3    topiramate (TOPAMAX) 50 MG tablet, Take 1 tab nightly for 4 nights, and then take 1 tab twice daily. (Patient not taking: Reported on 8/30/2021), Disp: 60 tablet, Rfl: 3    calcium carbonate 600 MG TABS tablet, Take 1 tablet by mouth daily (Patient not taking: Reported on 8/30/2021), Disp: , Rfl:       Medications: documentation has been reviewed in the electronic medical record and patient office intake form. REVIEW OF SYSTEMS:  Constitutional: Negative for unexpected weight change. Eyes: Negative for visual disturbance. Respiratory: Negative for cough and shortness of breath. Cardiovascular: Negative for chest pain. Gastrointestinal: Negative for abdominal pain. Musculoskeletal: Negative for arthralgias and myalgias. Neurological: Negative for headaches. Hematological: Negative for adenopathy. Psychiatric/Behavioral: Negative for dysphoric mood. The patient is not nervous/anxious. PHYSICAL EXAM:  /68   Pulse 92   Temp 97.2 °F (36.2 °C)   Resp 18   Ht 5' 5.5\" (1.664 m)   Wt 170 lb 12.8 oz (77.5 kg)   LMP 08/22/2017   SpO2 98%   BMI 27.99 kg/m²   Constitutional: She appearswell-nourished. No apparent distress. Breast: The patient was examined in the upright and supine position. Breasts are symmetrically ptotic. Right: No new masses or changes in breast contour. No skin changes of the breast or nipple areolar complex. No nipple inversion or discharge. No erythema, thickening (peau d'orange), or dimpling. Left: No new masses or changes in breast contour. No skin changes of the breast or nipple areolar complex. No nipple inversion or discharge. No erythema, thickening (peau d'orange), or dimpling. There is no axillary lymphadenopathy palpated bilaterally. Head: Normocephalic and atraumatic  Eyes: EOM are normal. Pupils are equal, round, and reactive to light. Neck: Neck supple. No tracheal deviation present. No obvious mass.   Lymphatics: No palpable supraclavicular, cervical, or axillary lymphadenopathy  Skin: No rash noted. No erythema. Neurologic: alert and oriented. Extremities: appear well perfused. Risk assessment using EMA Breast Cancer Risk Evaluation Tool to evaluate her risk compared to the general population. Her lifetime risk for breast cancer is 34.4% (general population average 10.5%). ASSESSMENT:  - High Risk for Breast Cancer based on increased risk profile for breast cancer. Digna (EMA) 8.0 risk estimate calculated at  34.4% today (>20% is considered high risk). - Screening Breast Examination   - Family History of Breast Cancer       PLAN:    1. Surveillance: We discussed the NCCN guidelines for high risk surveillance. This includes annual screening mammography beginning 10 years prior to youngest affected family member (but not before age 27), annual screening MRI beginning 10 years prior to youngest affected family member (but not before age 22), and clinical breast exams every 6-12 months. We also stressed the importance of breast awareness. - Bilateral screening mammogram is over due, last mammogram 2017. Reluctant to have imaging due to radiation exposure. Long discussion about exposure and other risks. Patient is not willing to schedule at this time but will continue to think about it. - Bilateral breast MRI due now. Similar to mammogram and radiation exposure patient declines to have MRI due to gadolinium contrast exposure uptake. Long discussion and education provided about risk. Patient will continue to think about it. She is more inclined to have the MRI vs Mammogram.    - Clinical breast exam recommended every 6 months. Patient is agreeable and interested in having a clinical screening breast exam preformed every 6 months. Due 3/2023       3. Medical Oncology: We discussed the role of chemoprophylaxis in women with an increased risk of breast cancer. At this time Ms. Hoendorf expresses that she is not interested in antiestrogen therapy. We will refer her to medical oncology should she wish to revisit this in the future. 4. Referral for Genetic Counseling - she is interested in testing but would like time to consider and discuss again at next visit. 5. Education provided for Healthy Lifestyle Recommendations: healthy diet (decrease consumption of red meat, increase fresh fruits and vegetables), decreased alcohol consumption, adequate sleep (goal 6-8 hours), routine exercise (goal 150 minutes/week or greater), weight control. Stress reduction techniques discussed      6. Most recent breast imaging was reviewed, discussed with the patient and documented above. CHRISTIANA Pierre-CNP  [facility])   Surgical Breast Oncology   [phone]      All of the patient's questions were answered at this time however, she was encouraged to call the office with any further inquiries. Approximately 20 minutes of time were spent in preparation, direct patient contact, counseling, care coordination, documentation and activities otherwise related to this encounter.

## 2023-05-25 NOTE — PATIENT INSTRUCTIONS
Healthy Lifestyle Recommendations: healthy diet (decrease consumption of red meat, increase fresh fruits and vegetables), decreased alcohol consumption (less than 4 drinks/week), adequate sleep (goal 6-8 hours), routine exercise (goal 150 minutes/week or greater), weight control. Patient Education        Breast Self-Exam: Care Instructions  Your Care Instructions     A breast self-exam is when you check your breasts for lumps or changes. This regular exam helps you learn how your breasts normally look and feel. Most breast problems or changes are not because of cancer. Breast self-exam is not a substitute for a mammogram. Having regular breast exams by your doctor and regular mammograms improve your chances of finding any problems with your breasts. Some women set a time each month to do a step-by-step breast self-exam. Other women like a less formal system. They might look at their breasts as they brush their teeth, or feel their breasts once in a while in the shower. If you notice a change in your breast, tell your doctor. Follow-up care is a key part of your treatment and safety. Be sure to make and go to all appointments, and call your doctor if you are having problems. It's also a good idea to know your test results and keep a list of the medicines you take. How do you do a breast self-exam?  · The best time to examine your breasts is usually one week after your menstrual period begins. Your breasts should not be tender then. If you do not have periods, you might do your exam on a day of the month that is easy to remember. · To examine your breasts:  ? Remove all your clothes above the waist and lie down. When you are lying down, your breast tissue spreads evenly over your chest wall, which makes it easier to feel all your breast tissue. ?  Use the padsnot the fingertipsof the 3 middle fingers of your left hand to check your right breast. Move your fingers slowly in small coin-sized circles that [FreeTextEntry1] : patient is here for annual exam . Patient had a recent transvaginal sonogram showed a 3.4 cm left paraovarian cyst overlap. ? Use three levels of pressure to feel of all your breast tissue. Use light pressure to feel the tissue close to the skin surface. Use medium pressure to feel a little deeper. Use firm pressure to feel your tissue close to your breastbone and ribs. Use each pressure level to feel your breast tissue before moving on to the next spot. ? Check your entire breast, moving up and down as if following a strip from the collarbone to the bra line, and from the armpit to the ribs. Repeat until you have covered the entire breast.  ? Repeat this procedure for your left breast, using the pads of the 3 middle fingers of your right hand. · To examine your breasts while in the shower:  ? Place one arm over your head and lightly soap your breast on that side. ? Using the pads of your fingers, gently move your hand over your breast (in the strip pattern described above), feeling carefully for any lumps or changes. ? Repeat for the other breast.  · Have your doctor inspect anything you notice to see if you need further testing. Where can you learn more? Go to https://Skuid.eMagin. org and sign in to your [employer] account. Enter P148 in the Matchbox box to learn more about \"Breast Self-Exam: Care Instructions. \"     If you do not have an account, please click on the \"Sign Up Now\" link. Current as of: April 29, 2020               Content Version: 12.6  © 9086-3299 [employer]. Care instructions adapted under license by [facility] ([facility]). If you have questions about a medical condition or this instruction, always ask your healthcare professional. [place] [address] any warranty or liability for your use of this information.

## 2024-05-28 ENCOUNTER — TELEPHONE (OUTPATIENT)
Dept: FAMILY MEDICINE CLINIC | Age: 59
End: 2024-05-28

## 2024-05-28 NOTE — TELEPHONE ENCOUNTER
----- Message from Kofi Trejo sent at 5/28/2024 11:32 AM EDT -----  Regarding: E[facility] Appointment Request  E[facility] Appointment Request    Patient needs appointment for ECC Appointment Type: New to Provider.    Reason for Appointment Request: Requested Provider unavailable  preferred doctor is Cornelius العلي  --------------------------------------------------------------------------------------------------------------------------    Relationship to Patient: Self     Call Back Information: OK to leave message on voicemail  Preferred Call Back Number:  [phone]

## 2024-06-03 NOTE — PATIENT INSTRUCTIONS
You may receive a survey regarding the care you received during your visit.  Your input is valuable to us.  We encourage you to complete and return your survey.  We hope you will choose us in the future for your healthcare needs. GENERAL OFFICE POLICIES      Telephone Calls: Messages will be answered within 1-2 business days, unless the provider is out of the office.  If it is urgent a covering provider will answer. (this does not include Medication refills).    MyChart:  We recommend all patients sign up for [employer].  Through this portal you can see your lab results, request refills, schedule appointments, pay your bill and send messages to the office.   [employer] messages will be answered within 1-2 business days unless the provider is out of the office.  For urgent matters, please call the office.  Appointments:  All appointments must be scheduled.  We ask all patients to schedule their next follow up appointment before they leave the office to make sure you will be able to be seen before you run out of medications.  24 hours notice is required to cancel or reschedule an appointment to avoid being marked as a no show.  You may be dismissed from the practice after 3 no shows.    LATE for Appointment: If you are 15 or more minutes late for your appointment, you may be asked to reschedule.  MA/LAB APPTS: Must be scheduled, cannot accept walk in lab visits.  We only draw labs for patients established in our office.  We only do injections for medications ordered by our office.  Acute Sick Visits:  Nothing other than acute complaint will be addressed at this visit.  TRADITIONAL MEDICARE  DOES NOT COVER PHYSICALS  MEDICARE WELLNESS VISITS: These are NOT physicals but the free annual visit offered by Medicare to discuss wellness issues. Medication refills, checkups, etc. will not be addressed during this visit.  Medication Refills: Refills are handled electronically so please contact your pharmacy for medication refills

## 2024-06-05 ENCOUNTER — TELEPHONE (OUTPATIENT)
Dept: FAMILY MEDICINE CLINIC | Age: 59
End: 2024-06-05

## 2024-06-05 ENCOUNTER — OFFICE VISIT (OUTPATIENT)
Dept: FAMILY MEDICINE CLINIC | Age: 59
End: 2024-06-05

## 2024-06-05 VITALS
HEART RATE: 61 BPM | HEIGHT: 66 IN | BODY MASS INDEX: 27.97 KG/M2 | SYSTOLIC BLOOD PRESSURE: 120 MMHG | DIASTOLIC BLOOD PRESSURE: 70 MMHG | WEIGHT: 174 LBS | OXYGEN SATURATION: 95 %

## 2024-06-05 DIAGNOSIS — Z00.00 ENCOUNTER FOR WELL ADULT EXAM WITHOUT ABNORMAL FINDINGS: Primary | ICD-10-CM

## 2024-06-05 DIAGNOSIS — G43.109 MIGRAINE WITH AURA AND WITHOUT STATUS MIGRAINOSUS, NOT INTRACTABLE: ICD-10-CM

## 2024-06-05 DIAGNOSIS — H81.01 LABYRINTHINE VERTIGO WITH INVOLVEMENT OF RIGHT INNER EAR: ICD-10-CM

## 2024-06-05 DIAGNOSIS — J30.1 SEASONAL ALLERGIC RHINITIS DUE TO POLLEN: Primary | ICD-10-CM

## 2024-06-05 DIAGNOSIS — Z13.1 SCREENING FOR DIABETES MELLITUS: ICD-10-CM

## 2024-06-05 DIAGNOSIS — E55.9 VITAMIN D DEFICIENCY: ICD-10-CM

## 2024-06-05 DIAGNOSIS — Z13.220 SCREENING, LIPID: ICD-10-CM

## 2024-06-05 LAB
25(OH)D3 SERPL-MCNC: 156.2 NG/ML
ALBUMIN SERPL-MCNC: 4.4 G/DL (ref 3.4–5)
ALBUMIN/GLOB SERPL: 1.8 {RATIO} (ref 1.1–2.2)
ALP SERPL-CCNC: 57 U/L (ref 40–129)
ALT SERPL-CCNC: 15 U/L (ref 10–40)
ANION GAP SERPL CALCULATED.3IONS-SCNC: 8 MMOL/L (ref 3–16)
AST SERPL-CCNC: 18 U/L (ref 15–37)
BASOPHILS # BLD: 0 K/UL (ref 0–0.2)
BASOPHILS NFR BLD: 1 %
BILIRUB SERPL-MCNC: 0.4 MG/DL (ref 0–1)
BUN SERPL-MCNC: 18 MG/DL (ref 7–20)
CALCIUM SERPL-MCNC: 9.6 MG/DL (ref 8.3–10.6)
CHLORIDE SERPL-SCNC: 103 MMOL/L (ref 99–110)
CHOLEST SERPL-MCNC: 167 MG/DL (ref 0–199)
CO2 SERPL-SCNC: 28 MMOL/L (ref 21–32)
CREAT SERPL-MCNC: 0.7 MG/DL (ref 0.6–1.1)
DEPRECATED RDW RBC AUTO: 14.2 % (ref 12.4–15.4)
EOSINOPHIL # BLD: 0.1 K/UL (ref 0–0.6)
EOSINOPHIL NFR BLD: 2.8 %
GFR SERPLBLD CREATININE-BSD FMLA CKD-EPI: >90 ML/MIN/{1.73_M2}
GLUCOSE SERPL-MCNC: 94 MG/DL (ref 70–99)
HCT VFR BLD AUTO: 41.1 % (ref 36–48)
HDLC SERPL-MCNC: 85 MG/DL (ref 40–60)
HGB BLD-MCNC: 13.8 G/DL (ref 12–16)
LDLC SERPL CALC-MCNC: 72 MG/DL
LYMPHOCYTES # BLD: 1.5 K/UL (ref 1–5.1)
LYMPHOCYTES NFR BLD: 37.8 %
MCH RBC QN AUTO: 32.1 PG (ref 26–34)
MCHC RBC AUTO-ENTMCNC: 33.7 G/DL (ref 31–36)
MCV RBC AUTO: 95.3 FL (ref 80–100)
MONOCYTES # BLD: 0.3 K/UL (ref 0–1.3)
MONOCYTES NFR BLD: 8.2 %
NEUTROPHILS # BLD: 2 K/UL (ref 1.7–7.7)
NEUTROPHILS NFR BLD: 50.2 %
PLATELET # BLD AUTO: 232 K/UL (ref 135–450)
PMV BLD AUTO: 8.6 FL (ref 5–10.5)
POTASSIUM SERPL-SCNC: 4.7 MMOL/L (ref 3.5–5.1)
PROT SERPL-MCNC: 6.8 G/DL (ref 6.4–8.2)
RBC # BLD AUTO: 4.31 M/UL (ref 4–5.2)
SODIUM SERPL-SCNC: 139 MMOL/L (ref 136–145)
T4 FREE SERPL-MCNC: 1.2 NG/DL (ref 0.9–1.8)
TRIGL SERPL-MCNC: 48 MG/DL (ref 0–150)
TSH SERPL DL<=0.005 MIU/L-ACNC: 5.85 UIU/ML (ref 0.27–4.2)
VLDLC SERPL CALC-MCNC: 10 MG/DL
WBC # BLD AUTO: 3.9 K/UL (ref 4–11)

## 2024-06-05 RX ORDER — RIMEGEPANT SULFATE 75 MG/75MG
1 TABLET, ORALLY DISINTEGRATING ORAL DAILY PRN
Qty: 16 TABLET | Refills: 0 | Status: SHIPPED | OUTPATIENT
Start: 2024-06-05

## 2024-06-05 RX ORDER — MECLIZINE HYDROCHLORIDE 25 MG/1
12.5-25 TABLET ORAL 3 TIMES DAILY PRN
Qty: 30 TABLET | Refills: 0 | Status: SHIPPED | OUTPATIENT
Start: 2024-06-05 | End: 2024-06-15

## 2024-06-05 RX ORDER — FLUTICASONE PROPIONATE 50 MCG
2 SPRAY, SUSPENSION (ML) NASAL DAILY
Qty: 16 G | Refills: 0 | Status: SHIPPED | OUTPATIENT
Start: 2024-06-05

## 2024-06-05 SDOH — ECONOMIC STABILITY: FOOD INSECURITY: WITHIN THE PAST 12 MONTHS, YOU WORRIED THAT YOUR FOOD WOULD RUN OUT BEFORE YOU GOT MONEY TO BUY MORE.: NEVER TRUE

## 2024-06-05 SDOH — ECONOMIC STABILITY: INCOME INSECURITY: HOW HARD IS IT FOR YOU TO PAY FOR THE VERY BASICS LIKE FOOD, HOUSING, MEDICAL CARE, AND HEATING?: NOT HARD AT ALL

## 2024-06-05 SDOH — ECONOMIC STABILITY: FOOD INSECURITY: WITHIN THE PAST 12 MONTHS, THE FOOD YOU BOUGHT JUST DIDN'T LAST AND YOU DIDN'T HAVE MONEY TO GET MORE.: NEVER TRUE

## 2024-06-05 SDOH — ECONOMIC STABILITY: HOUSING INSECURITY
IN THE LAST 12 MONTHS, WAS THERE A TIME WHEN YOU DID NOT HAVE A STEADY PLACE TO SLEEP OR SLEPT IN A SHELTER (INCLUDING NOW)?: NO

## 2024-06-05 ASSESSMENT — PATIENT HEALTH QUESTIONNAIRE - PHQ9
SUM OF ALL RESPONSES TO PHQ QUESTIONS 1-9: 0
1. LITTLE INTEREST OR PLEASURE IN DOING THINGS: NOT AT ALL
SUM OF ALL RESPONSES TO PHQ QUESTIONS 1-9: 0
SUM OF ALL RESPONSES TO PHQ QUESTIONS 1-9: 0
SUM OF ALL RESPONSES TO PHQ9 QUESTIONS 1 & 2: 0
SUM OF ALL RESPONSES TO PHQ QUESTIONS 1-9: 0
2. FEELING DOWN, DEPRESSED OR HOPELESS: NOT AT ALL

## 2024-06-05 ASSESSMENT — ENCOUNTER SYMPTOMS
EYE DISCHARGE: 0
VOMITING: 0
SHORTNESS OF BREATH: 0
COUGH: 0
SINUS PRESSURE: 0
EYE PAIN: 0
EYE REDNESS: 0
SORE THROAT: 0
WHEEZING: 0
NAUSEA: 0
ABDOMINAL DISTENTION: 0
ABDOMINAL PAIN: 0
DIARRHEA: 0
SINUS PAIN: 0

## 2024-06-05 NOTE — PROGRESS NOTES
Well Adult Note  Name: Gwendolyn Hoendorf Today’s Date: 2024   MRN: [id number] Sex: Female   Age: 58 y.o. Ethnicity: Non- / Non    : 1965 Race: White (non-)      Gwendolyn Hoendorf is here for well adult exam.  History:  Lalita presents today to reestablish care as well as her annual physical.  Her main concern today is headaches and vertigo.  She states she has had vertigo for the past 3 days.  Also has headaches.  Has been trying black currant seed oil without alleviation.  The patient does note that she has had some allergy issues recently.  States she has been struggling with headaches for multiple years.  Has tried Topamax, sumatriptan, and Fioricet without alleviation.  Inquiring about additional options.  She had [place] somebody out of [place] who told her she should try Nurtec.  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    The patient denies any significant changes to her medical history since last seen.  She is fasting for labs but would prefer to do on a different day as she typically has trouble recovering after having blood drawn.  She follows with her breast specialist for close family history of breast cancer.  They have recommended that she do MRI as opposed to mammogram but she has declined this.  Would be open to doing ultrasound but would like to discuss with Dr. Torres.  She knows she is due for a Pap smear.  Will consider getting vaccines at a later date but declines today.    Review of Systems   Constitutional:  Negative for chills and fever.   HENT:  Negative for ear discharge, ear pain, hearing loss, sinus pressure, sinus pain and sore throat.    Eyes:  Negative for pain, discharge and redness.   Respiratory:  Negative for cough, shortness of breath and wheezing.    Cardiovascular:  Negative for chest pain and palpitations.   Gastrointestinal:  Negative for abdominal distention, abdominal pain, diarrhea, nausea and vomiting.

## 2024-06-05 NOTE — TELEPHONE ENCOUNTER
CALLED AND SPOKE TO PATIENT AND ADVISED ON FLONASE BEING SENT TO PHARMACY. PATIENT AWARE THAT WE WILL LET HER KNOW WHAT HAPPENS WITH THE PA DEPARTMENT. PLEASE INITIATE THE PA FOR PATIENT NURTEC. SC

## 2024-06-05 NOTE — TELEPHONE ENCOUNTER
Sorry, lost track of that. The PA should automatically go to the PA department. I had told them it would not be done today. May want to check to make sure it at least got initiated.

## 2024-06-05 NOTE — TELEPHONE ENCOUNTER
Patient saw Aiden today and he was to order Flonase medication.      [facility] - [address] - phone no. [phone]    Please give her a call back.     Per pharmacy NURTEC NEEDS A PA DONE. We have not receive anything from the pharmacy as of yet.       Out of pocket for nurtec over $2000.00

## 2024-06-06 LAB
EST. AVERAGE GLUCOSE BLD GHB EST-MCNC: 93.9 MG/DL
HBA1C MFR BLD: 4.9 %

## 2024-06-06 NOTE — TELEPHONE ENCOUNTER
Submitted PA for Nurtec 75MG dispersible tablets   [facility] Key: [id number]  STATUS: PENDING.    Follow up done daily; if no decision with in three days we will refax.  If another three days goes by with no decision will call the insurance for status.

## 2024-06-10 RX ORDER — RIZATRIPTAN BENZOATE 10 MG/1
10 TABLET ORAL
Qty: 9 TABLET | Refills: 0 | Status: SHIPPED | OUTPATIENT
Start: 2024-06-10 | End: 2024-06-10

## 2024-06-10 NOTE — TELEPHONE ENCOUNTER
She knew that there was a chance that they would require her to do another triptan first.  Sending rizatriptan to the pharmacy.  Let us know if it does not work, she has significant side effects, or if she is still having more headaches than the medication is allowed to adequately cover with quantity limitation, and it looks like Nurtec should be approved at that point without issue.

## 2024-06-11 ENCOUNTER — TELEPHONE (OUTPATIENT)
Dept: FAMILY MEDICINE CLINIC | Age: 59
End: 2024-06-11

## 2024-06-11 DIAGNOSIS — E03.8 SUBCLINICAL HYPOTHYROIDISM: Primary | ICD-10-CM

## 2024-06-11 NOTE — TELEPHONE ENCOUNTER
----- Message from Echo Marie sent at 6/11/2024 12:17 PM EDT -----  Regarding: ECC Message to Provider  ECC Message to Provider    Relationship to Patient: Self    Additional Information Patient already done the blood test and received also the test result of that aslo the thyroid  (hypothyroidism) His PCP told that if she need that medication levoxyl  please let them know.     --------------------------------------------------------------------------------------------------------------------------    Call Back Information: OK to leave message on voicemail  Preferred Call Back Number: Phone [phone] (home)

## 2024-06-11 NOTE — TELEPHONE ENCOUNTER
Called pt to clarify, pt would like to start medication for her hypothyroidism, but knows her family hx has an issue with generic brand levothyroxine and would like to have brand name Levoxyl.  Pt aware TR is out today and will wait for his return for response

## 2024-06-12 RX ORDER — LEVOTHYROXINE SODIUM 25 UG/1
25 TABLET ORAL DAILY
Qty: 30 TABLET | Refills: 3 | Status: SHIPPED | OUTPATIENT
Start: 2024-06-12

## 2024-06-12 NOTE — TELEPHONE ENCOUNTER
Rx sent for name brand Levoxyl with comments of family history of reaction to generic levothyroxine.  Just let her know that it is possible that insurance still will not cover this well and may prefer a different name brand.  May need an authorization.  She can see what the pharmacy says.

## 2024-06-21 ENCOUNTER — TELEPHONE (OUTPATIENT)
Dept: FAMILY MEDICINE CLINIC | Age: 59
End: 2024-06-21

## 2024-06-21 NOTE — TELEPHONE ENCOUNTER
Patient was calling to say that the Rizatriptan that we prescribed her makes her dizzy and was wondering if we can call her in something else for her migraines       [facility]   Phone number

## 2024-06-21 NOTE — TELEPHONE ENCOUNTER
The Nurtec is still active in the patient's chart.  Can we please send to the PA department to appeal?  She has now tried multiple medications as seen in my note.  To accommodate the denial, she has tried and failed ibuprofen, sumatriptan, and rizatriptan.  We tried to prescribe rizatriptan but she developed side effects.  Had already failed sumatriptan for multiple month trial.  Let the patient know we will try to push the Nurtec through again.

## 2024-06-24 ENCOUNTER — COMMUNITY OUTREACH (OUTPATIENT)
Dept: FAMILY MEDICINE CLINIC | Age: 59
End: 2024-06-24

## 2024-06-24 NOTE — PROGRESS NOTES
Patient's HM shows they are overdue for Mammogram.  Attune RTD and  files searched without success.

## 2024-06-24 NOTE — TELEPHONE ENCOUNTER
RE Submitted PA for Nurtec 75MG dispersible tablets   Via CM (Key: [id number])  STATUS: PENDING.    Follow up done daily; if no decision with in three days we will refax.  If another three days goes by with no decision will call the insurance for status.

## 2024-06-25 NOTE — TELEPHONE ENCOUNTER
The medication was DENIED; DENIAL letter uploaded to MEDIA.    If you want an APPEAL; please note in this encounter what new information you would like to APPEAL with.  Once complete route back to PA POOL.    If this requires a response please respond to the pool ( P [facility] MEDICATION PRE-AUTH).      Thank you please advise patient.    TRIAL DATES WERE SUBMITTED, CAN PROCEED WITH APPEAL.

## 2024-06-26 NOTE — TELEPHONE ENCOUNTER
Can we please double check this?  I was trying to send it for the as needed dose for when she has headaches as opposed to the prophylactic dose.  The initial denial just said she needed to try NSAIDs and 2 triptans which would make sense given that this is for acute migraine.  We resent the PA with the exact same number of tablets since she had already tried sumatriptan hand and ibuprofen and developed side effects when we tried rizatriptan, but now they are asking that she try prophylactic options first.  Just wanted to make sure that the PA was done correctly given that we got to completely different preferred regimens to do before getting the medication approved.

## 2024-06-26 NOTE — TELEPHONE ENCOUNTER
Spent over 45 minutes on the phone with the insurance.  Spoke to someone in the beginning who was adamant this would never be approved because it is a non-formulary medication. I advised him of the denial letter sent on 06/10 & 06/24 that stated we could appeal if pt had tried an NSAID and a triptan. At that time he transferred me to Tracey who stated this will be approved if we sent it over for episodic/acute and kept them the same on all PA's going forward (was submitted as episodic then resubmitted as acute), also it will need to be only sent for 8 tablets for 30 days, as they do not approve more tablets per month. They took a verbal for 8 tablets okay'd by TR and will get back to us within 24-48 if approved at that time.

## 2024-06-27 ENCOUNTER — TELEPHONE (OUTPATIENT)
Dept: FAMILY MEDICINE CLINIC | Age: 59
End: 2024-06-27

## 2024-06-27 DIAGNOSIS — J30.1 SEASONAL ALLERGIC RHINITIS DUE TO POLLEN: ICD-10-CM

## 2024-06-27 RX ORDER — FLUTICASONE PROPIONATE 50 MCG
2 SPRAY, SUSPENSION (ML) NASAL DAILY
Qty: 48 G | Refills: 1 | Status: SHIPPED | OUTPATIENT
Start: 2024-06-27

## 2024-06-27 NOTE — TELEPHONE ENCOUNTER
Patient drove up thru the pharmacy and told them to give us a call. He asked if there was a Chelsy. Chelsy was on another call.  Please give them a call back, concerning her medication NURTEC.     [facility] - [phone]

## 2024-06-27 NOTE — TELEPHONE ENCOUNTER
The medication is APPROVED.    If this requires a response please respond to the pool ( P [facility] MEDICATION PRE-AUTH).      Thank you please advise patient.;

## 2024-07-18 DIAGNOSIS — G43.109 MIGRAINE WITH AURA AND WITHOUT STATUS MIGRAINOSUS, NOT INTRACTABLE: ICD-10-CM

## 2024-07-18 DIAGNOSIS — E03.8 SUBCLINICAL HYPOTHYROIDISM: ICD-10-CM

## 2024-07-18 RX ORDER — LEVOTHYROXINE SODIUM 25 UG/1
25 TABLET ORAL DAILY
Qty: 90 TABLET | Refills: 2 | Status: SHIPPED | OUTPATIENT
Start: 2024-07-18

## 2024-07-18 RX ORDER — RIMEGEPANT SULFATE 75 MG/75MG
1 TABLET, ORALLY DISINTEGRATING ORAL DAILY PRN
Qty: 8 TABLET | Refills: 0 | Status: SHIPPED | OUTPATIENT
Start: 2024-07-18

## 2024-07-18 NOTE — TELEPHONE ENCOUNTER
They will only cover for 8 tablets for acute migraine. Will not cover for preventative without trying other things first. If she wants to discuss preventative options, She would need an appointment.

## 2024-07-18 NOTE — TELEPHONE ENCOUNTER
PATIENT STATES THIS MEDICATION IS WORKING WELL AND SHE WANTS TO TRY TO GET THIS THROUGH MAIL ORDER INSTEAD OF GOING EVERY MONTH. SC

## 2024-07-18 NOTE — TELEPHONE ENCOUNTER
Patient needs a refill on her medication NURTEC 75 MG TBDP - 8 QUANTITY - ONLY TAKES AS NEEDED.     [facility]- [address] - PHONE NO. [phone]    PLEASE GIVE HER A CALL BACK.    IF SHE CAN GET MORE THAN 8 PILLS, SHE WOULD LIKE TO HAVE THAT DONE.

## 2024-08-14 DIAGNOSIS — E03.8 SUBCLINICAL HYPOTHYROIDISM: ICD-10-CM

## 2024-08-14 RX ORDER — LEVOTHYROXINE SODIUM 25 UG/1
25 TABLET ORAL DAILY
Qty: 90 TABLET | Refills: 2 | Status: SHIPPED | OUTPATIENT
Start: 2024-08-14

## 2024-08-14 NOTE — TELEPHONE ENCOUNTER
Patient is calling trying to get her medication refilled.     LEVOXYL 25MCG, 1 tablet a day, 90 Days     [facility]   Phone number

## 2024-09-03 DIAGNOSIS — G43.109 MIGRAINE WITH AURA AND WITHOUT STATUS MIGRAINOSUS, NOT INTRACTABLE: ICD-10-CM

## 2024-09-04 RX ORDER — RIMEGEPANT SULFATE 75 MG/75MG
1 TABLET, ORALLY DISINTEGRATING ORAL DAILY PRN
Qty: 8 TABLET | Refills: 2 | Status: SHIPPED | OUTPATIENT
Start: 2024-09-04

## 2024-12-15 DIAGNOSIS — G43.109 MIGRAINE WITH AURA AND WITHOUT STATUS MIGRAINOSUS, NOT INTRACTABLE: ICD-10-CM

## 2024-12-16 RX ORDER — RIMEGEPANT SULFATE 75 MG/75MG
1 TABLET, ORALLY DISINTEGRATING ORAL DAILY PRN
Qty: 8 TABLET | Refills: 0 | Status: SHIPPED | OUTPATIENT
Start: 2024-12-16

## 2025-01-27 ENCOUNTER — TELEPHONE (OUTPATIENT)
Dept: FAMILY MEDICINE CLINIC | Age: 60
End: 2025-01-27

## 2025-01-27 DIAGNOSIS — G43.109 MIGRAINE WITH AURA AND WITHOUT STATUS MIGRAINOSUS, NOT INTRACTABLE: ICD-10-CM

## 2025-01-27 RX ORDER — RIMEGEPANT SULFATE 75 MG/75MG
1 TABLET, ORALLY DISINTEGRATING ORAL DAILY PRN
Qty: 8 TABLET | Refills: 2 | Status: SHIPPED | OUTPATIENT
Start: 2025-01-27

## 2025-02-06 ENCOUNTER — TELEPHONE (OUTPATIENT)
Dept: FAMILY MEDICINE CLINIC | Age: 60
End: 2025-02-06

## 2025-02-06 DIAGNOSIS — G43.109 MIGRAINE WITH AURA AND WITHOUT STATUS MIGRAINOSUS, NOT INTRACTABLE: ICD-10-CM

## 2025-02-06 RX ORDER — RIMEGEPANT SULFATE 75 MG/75MG
1 TABLET, ORALLY DISINTEGRATING ORAL DAILY PRN
Qty: 8 TABLET | Refills: 2 | Status: SHIPPED | OUTPATIENT
Start: 2025-02-06

## 2025-02-06 NOTE — TELEPHONE ENCOUNTER
Patient needs refill for Nurtec, she signed up for patient assistance co-pay card and was approved.    Please put this information in the comments sections of the RX.    Bin # [id number]  PCN # [id number]  GRP # [id number]  ID# [id number]

## 2025-04-09 ENCOUNTER — TELEPHONE (OUTPATIENT)
Dept: FAMILY MEDICINE CLINIC | Age: 60
End: 2025-04-09

## 2025-04-09 NOTE — TELEPHONE ENCOUNTER
CALLED AND SPOKE TO VELMA AT [facility] AND HE WAS CHECKING IN ON THIS FOR PATIENT. HE WAS ABLE TO DIG INTO THE SCRIPT AND GOT IT WORKED OUT WHERE HE COULD GET THE MEDICATION FILLED TODAY FOR PATIENT AND IT COST HER $0 OUT OF POCKET FOR THIS. I CALLED AND SPOKE TO PATIENT TO LET HER KNOW THIS WAS DONE. SC

## 2025-04-09 NOTE — TELEPHONE ENCOUNTER
Patient called in stating she never heard back about her NURTEC in February. She stated she tried to pick it up- doesn't remember when this was and they told her, her insurance won't cover this medicaiton. She stated even with the copay card the pharmacy would not give it to her at all, they didn't even offer for her to pay out of pocket. She said she assumed we would know to get her a call, we did not receive a call from the pharmacy or the pt that she could not get this medication.     She would like to know what she should do.   Please give her a call back.

## 2025-05-05 DIAGNOSIS — E03.8 SUBCLINICAL HYPOTHYROIDISM: ICD-10-CM

## 2025-05-05 RX ORDER — LEVOTHYROXINE SODIUM 25 UG/1
25 TABLET ORAL DAILY
Qty: 90 TABLET | Refills: 0 | Status: SHIPPED | OUTPATIENT
Start: 2025-05-05

## 2025-05-05 NOTE — TELEPHONE ENCOUNTER
LV 6/5/24 WITH TR FOR PHYSICAL NV NONE  Return in about 1 year (around 6/5/2025) for Annual Physical/Fasting Labs.

## 2025-07-31 DIAGNOSIS — G43.109 MIGRAINE WITH AURA AND WITHOUT STATUS MIGRAINOSUS, NOT INTRACTABLE: Primary | ICD-10-CM

## 2025-07-31 RX ORDER — UBROGEPANT 100 MG/1
1 TABLET ORAL DAILY PRN
Qty: 16 TABLET | Refills: 0 | Status: SHIPPED | OUTPATIENT
Start: 2025-07-31

## 2025-08-04 DIAGNOSIS — E03.8 SUBCLINICAL HYPOTHYROIDISM: ICD-10-CM

## 2025-08-04 RX ORDER — LEVOTHYROXINE SODIUM 25 UG/1
TABLET ORAL
Qty: 90 TABLET | Refills: 0 | Status: SHIPPED | OUTPATIENT
Start: 2025-08-04